# Patient Record
Sex: FEMALE | Race: BLACK OR AFRICAN AMERICAN | NOT HISPANIC OR LATINO | Employment: OTHER | URBAN - METROPOLITAN AREA
[De-identification: names, ages, dates, MRNs, and addresses within clinical notes are randomized per-mention and may not be internally consistent; named-entity substitution may affect disease eponyms.]

---

## 2021-07-13 ENCOUNTER — TRANSFERRED RECORDS (OUTPATIENT)
Dept: MULTI SPECIALTY CLINIC | Facility: CLINIC | Age: 51
End: 2021-07-13

## 2021-07-13 LAB — PAP SMEAR - HIM PATIENT REPORTED: NEGATIVE

## 2023-09-14 ENCOUNTER — TRANSFERRED RECORDS (OUTPATIENT)
Dept: HEALTH INFORMATION MANAGEMENT | Facility: CLINIC | Age: 53
End: 2023-09-14

## 2023-10-04 ENCOUNTER — OFFICE VISIT (OUTPATIENT)
Dept: FAMILY MEDICINE | Facility: CLINIC | Age: 53
End: 2023-10-04
Payer: MEDICAID

## 2023-10-04 VITALS
DIASTOLIC BLOOD PRESSURE: 76 MMHG | BODY MASS INDEX: 30.08 KG/M2 | TEMPERATURE: 97.5 F | OXYGEN SATURATION: 99 % | HEIGHT: 64 IN | SYSTOLIC BLOOD PRESSURE: 118 MMHG | HEART RATE: 66 BPM | RESPIRATION RATE: 18 BRPM | WEIGHT: 176.19 LBS

## 2023-10-04 DIAGNOSIS — Z00.00 ROUTINE GENERAL MEDICAL EXAMINATION AT A HEALTH CARE FACILITY: Primary | ICD-10-CM

## 2023-10-04 DIAGNOSIS — Z86.32 HISTORY OF GESTATIONAL DIABETES: ICD-10-CM

## 2023-10-04 DIAGNOSIS — Z12.11 SCREEN FOR COLON CANCER: ICD-10-CM

## 2023-10-04 DIAGNOSIS — Z12.31 VISIT FOR SCREENING MAMMOGRAM: ICD-10-CM

## 2023-10-04 DIAGNOSIS — E11.65 TYPE 2 DIABETES MELLITUS WITH HYPERGLYCEMIA, WITH LONG-TERM CURRENT USE OF INSULIN (H): ICD-10-CM

## 2023-10-04 DIAGNOSIS — Z79.4 TYPE 2 DIABETES MELLITUS WITH HYPERGLYCEMIA, WITH LONG-TERM CURRENT USE OF INSULIN (H): ICD-10-CM

## 2023-10-04 PROCEDURE — 99386 PREV VISIT NEW AGE 40-64: CPT | Performed by: NURSE PRACTITIONER

## 2023-10-04 PROCEDURE — 99214 OFFICE O/P EST MOD 30 MIN: CPT | Mod: 25 | Performed by: NURSE PRACTITIONER

## 2023-10-04 PROCEDURE — 99207 PR FOOT EXAM NO CHARGE: CPT | Performed by: NURSE PRACTITIONER

## 2023-10-04 RX ORDER — LISINOPRIL 2.5 MG/1
2.5 TABLET ORAL DAILY
Qty: 90 TABLET | Refills: 1 | Status: SHIPPED | OUTPATIENT
Start: 2023-10-04 | End: 2024-02-09

## 2023-10-04 RX ORDER — CETIRIZINE HYDROCHLORIDE 10 MG/1
10 TABLET ORAL PRN
COMMUNITY
End: 2024-02-09

## 2023-10-04 RX ORDER — LISINOPRIL 2.5 MG/1
2.5 TABLET ORAL DAILY
COMMUNITY
End: 2023-10-04

## 2023-10-04 RX ORDER — FLUTICASONE PROPIONATE 50 MCG
1 SPRAY, SUSPENSION (ML) NASAL DAILY
COMMUNITY
End: 2024-09-24

## 2023-10-04 RX ORDER — ATORVASTATIN CALCIUM 40 MG/1
40 TABLET, FILM COATED ORAL DAILY
COMMUNITY
End: 2023-10-04

## 2023-10-04 RX ORDER — ATORVASTATIN CALCIUM 40 MG/1
40 TABLET, FILM COATED ORAL DAILY
Qty: 90 TABLET | Refills: 1 | Status: SHIPPED | OUTPATIENT
Start: 2023-10-04 | End: 2024-09-24

## 2023-10-04 ASSESSMENT — ENCOUNTER SYMPTOMS
CHILLS: 0
HEADACHES: 0
ABDOMINAL PAIN: 0
BREAST MASS: 0
DIZZINESS: 0
MYALGIAS: 0
FEVER: 0
SORE THROAT: 0
NAUSEA: 0
JOINT SWELLING: 0
ARTHRALGIAS: 0
EYE PAIN: 0
FREQUENCY: 1
DIARRHEA: 0
HEMATURIA: 0
DYSURIA: 0
SHORTNESS OF BREATH: 0
WEAKNESS: 0
NERVOUS/ANXIOUS: 0
COUGH: 0
HEMATOCHEZIA: 0
HEARTBURN: 0
PALPITATIONS: 0
CONSTIPATION: 0
PARESTHESIAS: 0

## 2023-10-04 NOTE — PROGRESS NOTES
SUBJECTIVE:   CC: Sarai is an 53 year old who presents for preventive health visit.     Sarai is a 57 year old female (birthday is [birth year]) with a PMH of type 2 diabetes. She was originally dx in 2000 with gestational diabetes, and in 2008 started on insulin. She reports being treated for her thyroid approx 20 years ago, no current treatment. She recently moved here from [place] to be closer to a few of her children. She has 6 children and 10 grandchildren between [place], [place], and [place]. She presents today with her daughter to establish care. She has been without her insulin and Mounjaro for 2 weeks due to insurance issues/moving to [place]. She needs a refill on both and more test strips. She endorses poor control with the lowest A1C being around 9. She has a burning sensation in her feet and reports a 2-3 day hx of increase urinary frequency. Denies any burning, urgency, or pain.     She would like to do the following health maintenance:   Colonoscopy  mammogram      10/4/2023     1:44 PM   Additional Questions   Roomed by Lyla RODRIGUEZ       Healthy Habits:     Getting at least 3 servings of Calcium per day:  Yes    Bi-annual eye exam:  NO    Dental care twice a year:  NO    Sleep apnea or symptoms of sleep apnea:  None    Diet:  Diabetic    Frequency of exercise:  None    Taking medications regularly:  Yes    Medication side effects:  None    Additional concerns today:  Yes    Recently moved from [place]    Last A1c from 9/14/23 was 11.8      Today's PHQ-2 Score:       10/4/2023    10:48 AM   PHQ-2 ( 1999 [employer])   Q1: Little interest or pleasure in doing things 0   Q2: Feeling down, depressed or hopeless 0   PHQ-2 Score 0   Q1: Little interest or pleasure in doing things Not at all   Q2: Feeling down, depressed or hopeless Not at all   PHQ-2 Score 0       Diabetes Follow-up    How often are you checking your blood sugar? Not at all  What concerns do you have today about your diabetes? None and Blood sugar is often  over 200   Do you have any of these symptoms? (Select all that apply)  Burning in feet    Hyperlipidemia Follow-Up- started 2-3 weeks ago    Are you regularly taking any medication or supplement to lower your cholesterol?   Yes- Atorvastatin  Are you having muscle aches or other side effects that you think could be caused by your cholesterol lowering medication?  No    Hypertension Follow-up    Do you check your blood pressure regularly outside of the clinic? Yes   Are you following a low salt diet? No  Are your blood pressures ever more than 140 on the top number (systolic) OR more   than 90 on the bottom number (diastolic), for example 140/90? No    BP Readings from Last 2 Encounters:   10/04/23 118/76     No results found for: A1C, LDL    Have you ever done Advance Care Planning? (For example, a Health Directive, POLST, or a discussion with a medical provider or your loved ones about your wishes): No, advance care planning information given to patient to review.  Patient plans to discuss their wishes with loved ones or provider.      Social History     Tobacco Use    Smoking status: Never    Smokeless tobacco: Never   Substance Use Topics    Alcohol use: Never             10/4/2023    10:48 AM   Alcohol Use   Prescreen: >3 drinks/day or >7 drinks/week? No       Reviewed orders with patient.  Reviewed health maintenance and updated orders accordingly - Yes  Labs from [place] reviewed, CHICHI received    Breast Cancer Screening:        10/4/2023    10:50 AM   Breast CA Risk Assessment (FHS-7)   Do you have a family history of breast, colon, or ovarian cancer? No / Unknown       Mammogram Screening: Recommended mammography every 1-2 years with patient discussion and risk factor consideration  Pertinent mammograms are reviewed under the imaging tab.    History of abnormal Pap smear: NO - age 30-65 PAP every 5 years with negative HPV co-testing recommended     Reviewed and updated as needed this visit by clinical staff    "Tobacco   Meds              Reviewed and updated as needed this visit by Provider                 Past Medical History:   Diagnosis Date    Diabetes (H)       No past surgical history on file.    Review of Systems   Constitutional:  Negative for chills and fever.   HENT:  Negative for congestion, ear pain, hearing loss and sore throat.    Eyes:  Negative for pain and visual disturbance.   Respiratory:  Negative for cough and shortness of breath.    Cardiovascular:  Negative for chest pain, palpitations and peripheral edema.   Gastrointestinal:  Negative for abdominal pain, constipation, diarrhea, heartburn, hematochezia and nausea.   Breasts:  Negative for tenderness, breast mass and discharge.   Genitourinary:  Positive for frequency. Negative for dysuria, genital sores, hematuria, pelvic pain, vaginal bleeding and vaginal discharge.   Musculoskeletal:  Negative for arthralgias, joint swelling and myalgias.   Skin:  Negative for rash.   Neurological:  Negative for dizziness, weakness, headaches and paresthesias.   Psychiatric/Behavioral:  Negative for mood changes. The patient is not nervous/anxious.         OBJECTIVE:     /76   Pulse 66   Temp 97.5  F (36.4  C)   Resp 18   Ht 1.613 m (5' 3.5\")   Wt 79.9 kg (176 lb 3 oz)   SpO2 99%   BMI 30.72 kg/m      Physical Exam    GENERAL APPEARANCE: healthy, alert and no distress  EYES: Eyes grossly normal to inspection  HENT: ear canals and TM's normal, nose and mouth without ulcers or lesions, oropharynx clear and oral mucous membranes moist  NECK: no adenopathy, no asymmetry, masses  RESP: lungs clear to auscultation - no rales, rhonchi or wheezes  CV: regular rate and rhythm, normal S1 S2, no S3 or S4, no murmur, click or rub, no peripheral edema  ABDOMEN: soft, nontender, no masses and bowel sounds normal  MS: no musculoskeletal defects are noted and gait is age appropriate without ataxia  SKIN: no suspicious lesions or rashes and plantar warts - foot " "bilateral  PSYCH: mentation appears normal and affect normal/bright    Diabetic Foot Screen:  Any complaints of increased pain or numbness ?  YES \"burning\"  Is there a foot ulcer now or a history of foot ulcer? No   Does the foot have an abnormal shape? No   Are the nails thick, too long or ingrown? No   Are there any redness or open areas? No            Sensation Testing done at all points on the diagram with monofilament     Right Foot: Sensation Normal at all points  Left Foot: Sensation Normal at all points     Risk Category: 0- No loss of protective sensation  Performed by Zaida Oh DNP student at the [facility]          ASSESSMENT/PLAN:   Sarai was seen today for physical.    Diagnoses and all orders for this visit:    Routine general medical examination at a health care facility    Visit for screening mammogram  Referral placed  -     MA SCREENING DIGITAL BILAT - Future  (s+30); Future    Screen for colon cancer  Referral placed  -     Colonoscopy Screening  Referral; Future    Type 2 diabetes mellitus with hyperglycemia, with long-term current use of insulin (H)  History of gestational diabetes  Currently uncontrolled Diabetes.    Refills sent for lantus, lisinopril, lipitor, and test strips. In light of insurance coverage concerns will switch to semaglutide as patient was having success with Mounjaro will start at 0.5 mg (Semaglutide).  Return in two months for fasting lab panel to include A1C, BMP, lipid panel, and thyroid levels. In the meantime referrals for medication management, diabetes educator, and  opthalmology have been placed.   Recommend focus on diet and lifestyle modifications as well, encouraged patient to check fasting and 2 hours post-prandial blood sugars.    -     insulin glargine (LANTUS PEN) 100 UNIT/ML pen; Inject 32 Units Subcutaneous At Bedtime  -     lisinopril (ZESTRIL) 2.5 MG tablet; Take 1 tablet (2.5 mg) by mouth daily  -     atorvastatin (LIPITOR) 40 MG tablet; Take 1 " tablet (40 mg) by mouth daily  -     blood glucose (NO BRAND SPECIFIED) test strip; Use to test blood sugar 3 times daily or as directed.  -     Adult Eye  Referral; Future  -     semaglutide (OZEMPIC) 2 MG/3ML pen; Inject 0.5 mg Subcutaneous every 7 days  -     Med Therapy Management Referral  -     AMB Adult Diabetes Educator Referral; Future      Other orders  -     REVIEW OF HEALTH MAINTENANCE PROTOCOL ORDERS      Return in about 2 months (around 12/4/2023) for Diabetes Follow-up, In Clinic.      COUNSELING:  Reviewed preventive health counseling, as reflected in patient instructions  Special attention given to:        Vision screening       Colorectal Cancer Screening       Advance Care Planning        She reports that she has never smoked. She has never used smokeless tobacco.      Zaida Oh DNP student [employer]      I was present with the student who participated in the service and in the documentation of the note, which I have reviewed and verified. The history, reviews of systems, objective data, and assessment/plan were completed by myself.      VIKI Rushing [facility]

## 2023-10-04 NOTE — Clinical Note
Please abstract the following data from this visit with this patient into the appropriate field in Epic:  Tests that can be patient reported without a hard copy:  Pap smear done on this date: 7/13/2021 (approximately), by this group: [facility], results were negative/normal.   Other Tests found in the patient's chart through Chart Review/Care Everywhere:  {Abstract Quality List (Optional):254409}  Note to Abstraction: If this section is blank, no results were found via Chart Review/Care Everywhere.

## 2023-10-05 DIAGNOSIS — E11.65 TYPE 2 DIABETES MELLITUS WITH HYPERGLYCEMIA, WITH LONG-TERM CURRENT USE OF INSULIN (H): ICD-10-CM

## 2023-10-05 DIAGNOSIS — Z79.4 TYPE 2 DIABETES MELLITUS WITH HYPERGLYCEMIA, WITH LONG-TERM CURRENT USE OF INSULIN (H): ICD-10-CM

## 2023-10-05 NOTE — LETTER
November 24, 2023      Sarai Lopezyudelka  [address] [address]        We received a refill request from your pharmacy for your Ozempic medication.  At this time the nurses were able to give you a min refill, but you are due to be seen for a diabetic follow-up before the next refill.  This appointment can be scheduled by calling [phone] or can be scheduled via [employer] as well.    Thank you for choosing [facility]            Sincerely,          Evelin Cota, APRN, CNP

## 2023-10-13 ENCOUNTER — NURSE TRIAGE (OUTPATIENT)
Dept: FAMILY MEDICINE | Facility: CLINIC | Age: 53
End: 2023-10-13

## 2023-10-13 NOTE — TELEPHONE ENCOUNTER
Symptoms    Describe your symptoms: Blood sugars are over 377    Any pain: No - but diareah for 2 days    How long have you been having symptoms: 2  days - Peeing a lot - 2 wks    Have you been seen for this:  No    Appointment offered?: No    Triage offered?: Yes:     Home remedies tried: No    Preferred Pharmacy:         Could we send this information to you in [facility] or would you prefer to receive a phone call?:   Patient would prefer a phone call   Okay to leave a detailed message?: Yes at Cell number on file:    Telephone Information:   Mobile [phone]    -   PLEASE CALL HER  Talked to her Glenny - her daughter  - her # [phone]

## 2023-10-13 NOTE — TELEPHONE ENCOUNTER
Huddled with pcp- can come in for in person appointment  at 130 today-     Scheduled - advised of location, arrival and apt time.     Future Appointments 10/13/2023 - 4/10/2024        Date Visit Type Length Department Provider     10/13/2023  1:30 PM OFFICE VISIT 30 min [facility] Evelin Cota APRN CNP    Location Instructions:     [facility] is located at [address], [address] Free parking is available; access the lot by turning north from [address] onto [address], then west onto [address].              10/23/2023  9:00 AM NEW - MTM 60 min RI MTM Daniela Pop, RPH              10/23/2023  2:00 PM NEW ADULT EYE 30 min BK OPTOMETRY Royal, Brian L, OD              11/6/2023  5:30 PM MYC NEW OFFICE VISIT 30 min [facility] Kathleen Ojeda MD    Location Instructions:     [facility] is located at [address] about one mile east of the [address] exit off of [address]. To access the parking lot from [address], turn south onto [address]. From [address], turn west onto [address], then north onto [address].              12/1/2023 11:30 AM OFFICE VISIT 30 min [facility] Evelin Cota APRN CNP    Location Instructions:     [facility] is located at [address], [address] Free parking is available; access the lot by turning north from [address] onto [address], then west onto [address].

## 2023-10-13 NOTE — TELEPHONE ENCOUNTER
Attempt # 1    Called # [phone]     Left a non detailed VM to call back at [phone] and ask for any available Triage Nurse.    Hanna Miller RN  [facility]

## 2023-10-13 NOTE — TELEPHONE ENCOUNTER
Situation     Patient called back with daughter   She states her blood sugars are been over 350 for 5 days.  Today's blood sugar @ 5am today was 377     Background     She does not have ketone andrey at home - going to get one today.    Patient has been taking 21 units of Lantus in and am and 21 at night.   Has taken 21 units of  lantus this morning  Waiting for Ozempic P, notes  states in process as of 10/12  . Used to take Mounjaro     Assessment   Denied fruity or acetone breath   She has a moderate headache - took 2 tylenol and it helped the headache a little but it  comes back.   She feels weak   the daughter states she is forgetful but no confusion   She feels tired   States breathing is fine, denied breathing rapid   Voiding every 2 hours for the last 2 weeks   Diarrhea started today - 6 small liquid stools since 6am today   Up walking around- no dizziness  She ate oatmeal today and is drinking well.   Denied any other symptoms     Recommendations   Advised will huddle with pcp and see if she advices in person clinic appointment, ADS or ER   Epic message sent   Can't completely fill out protocol due to unknown ketone levels.   Patient and daughter is aware of all signs and symptoms of high and low blood sugar  Reviewed red flag symptoms. - ED/911   Daughters cell is [phone]-  Reason for Disposition   Patient sounds very sick or weak to the triager   Blood glucose > 300 mg/dL (16.7 mmol/L) AND two or more times in a row    Additional Information   Negative: Unconscious or difficult to awaken   Negative: Acting confused (e.g., disoriented, slurred speech)   Negative: Very weak (can't stand)   Negative: Sounds like a life-threatening emergency to the triager   Negative: Vomiting and signs of dehydration (e.g., very dry mouth, lightheaded, dark urine)   Negative: Blood glucose > 240 mg/dL (13.3 mmol/L) and rapid breathing   Negative: Vomiting lasting > 4 hours   Negative: Fever > 100.4 F (38.0 C)    Negative: Caller has URGENT medication or insulin pump question and triager unable to answer question   Negative: Blood glucose > 500 mg/dL (27.8 mmol/L)   Commented on: Blood glucose > 240 mg/dL (13.3 mmol/L) AND urine ketones moderate-large (or more than 1+)     Ketone level unknown    Protocols used: Diabetes - High Blood Sugar-A-[place]

## 2023-10-16 ENCOUNTER — TELEPHONE (OUTPATIENT)
Dept: FAMILY MEDICINE | Facility: CLINIC | Age: 53
End: 2023-10-16
Payer: MEDICAID

## 2023-10-16 DIAGNOSIS — E11.65 TYPE 2 DIABETES MELLITUS WITH HYPERGLYCEMIA, WITH LONG-TERM CURRENT USE OF INSULIN (H): Primary | ICD-10-CM

## 2023-10-16 DIAGNOSIS — Z79.4 TYPE 2 DIABETES MELLITUS WITH HYPERGLYCEMIA, WITH LONG-TERM CURRENT USE OF INSULIN (H): Primary | ICD-10-CM

## 2023-10-16 NOTE — TELEPHONE ENCOUNTER
Medication Question or Refill        What medication are you calling about (include dose and sig)?: Ozempic - Patient still wondering what is going on and why this was denied.  Is there another rx you were prescribing?  Daughter calling in    Preferred Pharmacy:       [id number] - [place], [place] - [address] AT [address]  [address]  [place] [address]  Phone: [phone] Fax: [phone]      Controlled Substance Agreement on file:   CSA -- Patient Level:    CSA: None found at the patient level.       Who prescribed the medication?: PCP    Do you need a refill? Yes    When did you use the medication last? Has not been on it for over a month.  Blood sugar going up and down    Patient offered an appointment? No    Do you have any questions or concerns?  Yes: above      Could we send this information to you in [employer] or would you prefer to receive a phone call?:   Patient would prefer a phone call   Okay to leave a detailed message?: Yes at Cell number on file:    Telephone Information:   Mobile [phone]     Daughters phone  Servando DOYLE

## 2023-10-17 RX ORDER — DAPAGLIFLOZIN 5 MG/1
5 TABLET, FILM COATED ORAL DAILY
Qty: 90 TABLET | Refills: 1 | Status: SHIPPED | OUTPATIENT
Start: 2023-10-17 | End: 2023-10-23

## 2023-10-17 NOTE — TELEPHONE ENCOUNTER
JUSTIN gross reviewed.   Required for trial of SGLT-Inhibitor like Farxiga.      Will send prescription to patient's pharmacy.     Additionally, recommend further consultation/management with PharmD. Referral placed.     Needs HgbA1C, plan lab only visit to complete.       - Maddison, CNP

## 2023-10-20 NOTE — PROGRESS NOTES
Medication Therapy Management (MTM) Encounter    ASSESSMENT:                            Medication Adherence/Access: See below for considerations    Diabetes:   Self monitoring of blood glucose is not at goal of fasting  mg/dL and post prandial < 180 mg/dL.  Encouraged patient to schedule lab visit to complete lab work, A1c.  Discussed with patient that her insurance in [place] does not cover Ozempic. Informed patient that her insurance does cover a medication like Ozempic called Victoza, which is a daily injection but is like Ozempic, but patient declined stating she needs Ozempic and this is what she was on in [place]. Discussed that she had different insurance in [place] and [place] Medicaid won't cover Ozempic unless she tries their preferred medications first. Offered for patient to try Farxiga or Jardiance, but patient said she tried a pill before to manage her diabetes and that caused severe diarrhea. Discussed that this was likely metformin, and these pills work differently and shouldn't cause those side effects. Patient declined and stated she will not take any pills to manage her diabetes. Reports she will only take Ozempic for diabetes. Tried to provide education that we are limited by what her insurance will cover, and Medicaid requires she try other options first. Offered to increase insulin further to help control sugars but patient declined. Patient reported that if I couldn't get her Ozempic she didn't want to talk to me any further and ended our visit.    Hypertension:   Stable.    Hyperlipidemia:   Encouraged patient to schedule lab visit to complete lab work, lipid panel.    Allergic rhinitis:   Stable.    Supplements:  Stable.      PLAN:                            Insurance doesn't cover Ozempic, and there isn't anything I can do to change that since you have [place] Medicaid. Medicaid does cover Victoza, a daily injection similar to Ozempic, and Farxiga or Jardiance. Reach out if you change your mind and would  "like to try either of these options. After trying these options your insurance may cover Ozempic.      Follow-up: Declined follow up. Patient will reach out if she wishes to talk further.    SUBJECTIVE/OBJECTIVE:                          Sarai Saunders is a 53 year old female called for an initial visit. She was referred to me from VIKI Barrett.      Reason for visit: med review, diabetes management.    Allergies/ADRs: Reviewed in chart  Past Medical History: Reviewed in chart  Tobacco: She reports that she has never smoked. She has never used smokeless tobacco.  Alcohol: not currently using    Medication Adherence/Access: Patient's insurance did not cover Ozempic. Medicaid does cover several SGLT-2's and Victoza instead.    Diabetes   Lantus 42 units daily    Patient reports that metformin caused a lot of diarrhea, and because of that she isn't interested in re-trying a pill for diabetes. Reports that even if Jardiance and Farxiga are different, she doesn't want to try these since Ozempic worked so well for her in the past.  Reports she recently moved here from [place]. When she was in [place] she was on once weekly Ozempic and reports this changed her life. Diabetes went from being uncontrolled to always within range. Reports it gave her more energy and felt like it gave her her life back. She wants to restart that here in [place], but hasn't been able to get it. Doesn't understand why her doctors won't give it to her.  Blood sugar monitoring: one time(s) daily; Ranges: (patient reported) Fasting- 400 mg/dL daily   Current diabetes symptoms: fatigue       Eye exam in the last 12 months? No  Foot exam: due  Urine Albumin: No results found for: \"UMALCR\"   No results found for: \"A1C\"    Hypertension   Lisinopril 2.5 mg daily  Patient reports no current medication side effects  Patient does not self-monitor blood pressure.       BP Readings from Last 3 Encounters:   10/04/23 118/76     Pulse Readings from Last 3 Encounters: "   10/04/23 66     Hyperlipidemia   Atorvastatin 40 mg daily  Patient reports no significant myalgias or other side effects.           Allergic Rhinitis:   cetirizine 10 mg as needed - hasn't needed lately  Flonase (fluticasone) nasal spray - 1 spray(s) each nostril once daily  No concerns with allergy symptoms at this time.    Supplements:  Calcium-vitamin D daily  No concerns with side effects.    Today's Vitals: There were no vitals taken for this visit.  ----------------      I spent 20 minutes with this patient today. All changes were made via collaborative practice agreement with VIKI Rushing CNP. A copy of the visit note was provided to the patient's provider(s).    A summary of these recommendations was sent via [employer].    Daniela Pop, PharmD  Medication Therapy Management Pharmacist  Voicemail: [phone]      Telemedicine Visit Details  Type of service:  Telephone visit  Start Time:  9:00 AM  End Time:  9:20 AM       Medication Therapy Recommendations  No medication therapy recommendations to display

## 2023-10-22 ENCOUNTER — HEALTH MAINTENANCE LETTER (OUTPATIENT)
Age: 53
End: 2023-10-22

## 2023-10-23 ENCOUNTER — HOSPITAL ENCOUNTER (EMERGENCY)
Facility: CLINIC | Age: 53
Discharge: HOME | End: 2023-10-23
Attending: EMERGENCY MEDICINE | Admitting: EMERGENCY MEDICINE
Payer: MEDICAID

## 2023-10-23 ENCOUNTER — VIRTUAL VISIT (OUTPATIENT)
Dept: PHARMACY | Facility: CLINIC | Age: 53
End: 2023-10-23
Attending: NURSE PRACTITIONER
Payer: MEDICAID

## 2023-10-23 ENCOUNTER — NURSE TRIAGE (OUTPATIENT)
Dept: FAMILY MEDICINE | Facility: CLINIC | Age: 53
End: 2023-10-23

## 2023-10-23 VITALS
RESPIRATION RATE: 18 BRPM | SYSTOLIC BLOOD PRESSURE: 158 MMHG | TEMPERATURE: 98.1 F | HEART RATE: 67 BPM | OXYGEN SATURATION: 98 % | DIASTOLIC BLOOD PRESSURE: 78 MMHG

## 2023-10-23 DIAGNOSIS — E11.65 TYPE 2 DIABETES MELLITUS WITH HYPERGLYCEMIA, WITH LONG-TERM CURRENT USE OF INSULIN (H): Primary | ICD-10-CM

## 2023-10-23 DIAGNOSIS — J30.1 ALLERGIC RHINITIS DUE TO POLLEN, UNSPECIFIED SEASONALITY: ICD-10-CM

## 2023-10-23 DIAGNOSIS — E11.65 HYPERGLYCEMIA DUE TO DIABETES MELLITUS (H): ICD-10-CM

## 2023-10-23 DIAGNOSIS — Z78.9 TAKES DIETARY SUPPLEMENTS: ICD-10-CM

## 2023-10-23 DIAGNOSIS — Z79.4 TYPE 2 DIABETES MELLITUS WITH HYPERGLYCEMIA, WITH LONG-TERM CURRENT USE OF INSULIN (H): ICD-10-CM

## 2023-10-23 DIAGNOSIS — E78.5 HYPERLIPIDEMIA LDL GOAL <70: ICD-10-CM

## 2023-10-23 DIAGNOSIS — I10 BENIGN ESSENTIAL HYPERTENSION: ICD-10-CM

## 2023-10-23 DIAGNOSIS — Z79.4 TYPE 2 DIABETES MELLITUS WITH HYPERGLYCEMIA, WITH LONG-TERM CURRENT USE OF INSULIN (H): Primary | ICD-10-CM

## 2023-10-23 DIAGNOSIS — E11.65 TYPE 2 DIABETES MELLITUS WITH HYPERGLYCEMIA, WITH LONG-TERM CURRENT USE OF INSULIN (H): ICD-10-CM

## 2023-10-23 LAB
ANION GAP SERPL CALCULATED.3IONS-SCNC: 7 MMOL/L (ref 7–15)
B-OH-BUTYR SERPL-SCNC: 0.3 MMOL/L
BASE EXCESS BLDV CALC-SCNC: 1.6 MMOL/L (ref -7.7–1.9)
BASOPHILS # BLD AUTO: 0 10E3/UL (ref 0–0.2)
BASOPHILS NFR BLD AUTO: 1 %
BUN SERPL-MCNC: 14.3 MG/DL (ref 6–20)
CALCIUM SERPL-MCNC: 8.8 MG/DL (ref 8.6–10)
CHLORIDE SERPL-SCNC: 100 MMOL/L (ref 98–107)
CREAT SERPL-MCNC: 0.51 MG/DL (ref 0.51–0.95)
DEPRECATED HCO3 PLAS-SCNC: 27 MMOL/L (ref 22–29)
EGFRCR SERPLBLD CKD-EPI 2021: >90 ML/MIN/1.73M2
EOSINOPHIL # BLD AUTO: 0.2 10E3/UL (ref 0–0.7)
EOSINOPHIL NFR BLD AUTO: 3 %
ERYTHROCYTE [DISTWIDTH] IN BLOOD BY AUTOMATED COUNT: 12.4 % (ref 10–15)
GLUCOSE BLDC GLUCOMTR-MCNC: 445 MG/DL (ref 70–99)
GLUCOSE SERPL-MCNC: 433 MG/DL (ref 70–99)
HBA1C MFR BLD: 11.9 %
HCO3 BLDV-SCNC: 28 MMOL/L (ref 21–28)
HCT VFR BLD AUTO: 38.5 % (ref 35–47)
HGB BLD-MCNC: 12.7 G/DL (ref 11.7–15.7)
IMM GRANULOCYTES # BLD: 0 10E3/UL
IMM GRANULOCYTES NFR BLD: 0 %
LYMPHOCYTES # BLD AUTO: 1.6 10E3/UL (ref 0.8–5.3)
LYMPHOCYTES NFR BLD AUTO: 33 %
MCH RBC QN AUTO: 29.3 PG (ref 26.5–33)
MCHC RBC AUTO-ENTMCNC: 33 G/DL (ref 31.5–36.5)
MCV RBC AUTO: 89 FL (ref 78–100)
MONOCYTES # BLD AUTO: 0.6 10E3/UL (ref 0–1.3)
MONOCYTES NFR BLD AUTO: 11 %
NEUTROPHILS # BLD AUTO: 2.5 10E3/UL (ref 1.6–8.3)
NEUTROPHILS NFR BLD AUTO: 52 %
NRBC # BLD AUTO: 0 10E3/UL
NRBC BLD AUTO-RTO: 0 /100
O2/TOTAL GAS SETTING VFR VENT: 0 %
PCO2 BLDV: 52 MM HG (ref 40–50)
PH BLDV: 7.35 [PH] (ref 7.32–7.43)
PLATELET # BLD AUTO: 256 10E3/UL (ref 150–450)
PO2 BLDV: 18 MM HG (ref 25–47)
POTASSIUM SERPL-SCNC: 4.9 MMOL/L (ref 3.4–5.3)
RBC # BLD AUTO: 4.34 10E6/UL (ref 3.8–5.2)
SODIUM SERPL-SCNC: 134 MMOL/L (ref 135–145)
WBC # BLD AUTO: 4.9 10E3/UL (ref 4–11)

## 2023-10-23 PROCEDURE — 83036 HEMOGLOBIN GLYCOSYLATED A1C: CPT | Performed by: EMERGENCY MEDICINE

## 2023-10-23 PROCEDURE — 99605 MTMS BY PHARM NP 15 MIN: CPT | Performed by: PHARMACIST

## 2023-10-23 PROCEDURE — 82010 KETONE BODYS QUAN: CPT | Performed by: EMERGENCY MEDICINE

## 2023-10-23 PROCEDURE — 82962 GLUCOSE BLOOD TEST: CPT

## 2023-10-23 PROCEDURE — 36415 COLL VENOUS BLD VENIPUNCTURE: CPT | Performed by: EMERGENCY MEDICINE

## 2023-10-23 PROCEDURE — 99283 EMERGENCY DEPT VISIT LOW MDM: CPT | Mod: 25

## 2023-10-23 PROCEDURE — 82310 ASSAY OF CALCIUM: CPT | Performed by: EMERGENCY MEDICINE

## 2023-10-23 PROCEDURE — 258N000003 HC RX IP 258 OP 636: Performed by: EMERGENCY MEDICINE

## 2023-10-23 PROCEDURE — 82803 BLOOD GASES ANY COMBINATION: CPT | Performed by: EMERGENCY MEDICINE

## 2023-10-23 PROCEDURE — 99607 MTMS BY PHARM ADDL 15 MIN: CPT | Performed by: PHARMACIST

## 2023-10-23 PROCEDURE — 85025 COMPLETE CBC W/AUTO DIFF WBC: CPT | Performed by: EMERGENCY MEDICINE

## 2023-10-23 PROCEDURE — 96360 HYDRATION IV INFUSION INIT: CPT

## 2023-10-23 RX ORDER — LIRAGLUTIDE 6 MG/ML
0.6 INJECTION SUBCUTANEOUS DAILY
Qty: 3 ML | Refills: 0 | Status: SHIPPED | OUTPATIENT
Start: 2023-10-23 | End: 2024-02-15

## 2023-10-23 RX ADMIN — SODIUM CHLORIDE 1000 ML: 9 INJECTION, SOLUTION INTRAVENOUS at 15:55

## 2023-10-23 ASSESSMENT — ACTIVITIES OF DAILY LIVING (ADL): ADLS_ACUITY_SCORE: 35

## 2023-10-23 NOTE — PATIENT INSTRUCTIONS
"Recommendations from today's MTM visit:                                                    MTM (medication therapy management) is a service provided by a clinical pharmacist designed to help you get the most of out of your medicines.   Today we reviewed what your medicines are for, how to know if they are working, that your medicines are safe and how to make your medicine regimen as easy as possible.      Insurance doesn't cover Ozempic, and there isn't anything I can do to change that since you have [place] Medicaid. Medicaid does cover Victoza, a daily injection similar to Ozempic, and Farxiga or Jardiance. Reach out if you change your mind and would like to try either of these options. After trying these options your insurance may cover Ozempic.    Follow-up: Reach out if you wish to discuss this further.    It was great speaking with you today.  I value your experience and would be very thankful for your time in providing feedback in our clinic survey. In the next few days, you may receive an email or text message from [employer] with a link to a survey related to your  clinical pharmacist.\"     To schedule another MTM appointment, please call the clinic directly or you may call the MTM scheduling line at [phone] or toll-free at [phone].     My Clinical Pharmacist's contact information:                                                      Please feel free to contact me with any questions or concerns you have.      Daniela Pop, Rm  Medication Therapy Management Pharmacist  Voicemail: [phone]    "

## 2023-10-23 NOTE — ED TRIAGE NOTES
Patient was sent from her provider as her blood sugar was over 500.  Patient is a insulin dependent diabetic and her insurance has not been willing to cover her insulin so she has not been taking it for the last month.      Triage Assessment (Adult)       Row Name 10/23/23 0634          Triage Assessment    Airway WDL WDL        Respiratory WDL    Respiratory WDL WDL        Skin Circulation/Temperature WDL    Skin Circulation/Temperature WDL WDL        Cardiac WDL    Cardiac WDL WDL        Peripheral/Neurovascular WDL    Peripheral Neurovascular WDL WDL        Cognitive/Neuro/Behavioral WDL    Cognitive/Neuro/Behavioral WDL WDL

## 2023-10-23 NOTE — Clinical Note
Hello,  I just wanted to let you know that I saw this patient. Please read my assessment and plan for full details. Ultimately I educated her that [place] [employer] insurance doesn't cover Ozempic, but they do cover Victoza or Farxiga/Jardiance. She declined anything that wasn't Ozempic. She was quite upset that we won't get her Ozempic. I don't know if you have any ideas where to go from here. Since she has [place] Medicaid she is required to try their preferred options first, and there isn't anything I can do to get Ozempic covered, unless we have documentation from her previous provider that she tried and failed all of the preferred options.  Thanks!  Daniela Pop, PharmD Medication Therapy Management Pharmacist Voicemail: [phone]

## 2023-10-23 NOTE — DISCHARGE INSTRUCTIONS
We are writing prescriptions for diabetic medication to increase her care at home.  If you are interested in Ozempic treatment please follow-up with regular doctor once her insurance changes on November 1 to discuss further management your high sugar if you develop fever or sugars that do not respond to medication emergency room is always here to reassess you.  I today's lab tests are all normal except your sugar of 445.  Thanks your patient today.    Sliding scale coverage: 4 meals breakfast lunch and dinner.    Do not give insulin if Premeal blood sugar less than 140   blood sugar 140-164 give 1 unit  165-189 give 2 units  190-214 give 3 units  215-239 give 4 units  240-264 give 5 units  265-289 give 6 units  290-314 give 7 units  315-339 give 8 units  340-364 give 9 units  Greater than 365 give 10 units    ` For bedtime blood sugar:  Do not give bedtime correction if blood sugar less than 200.  200-224 give 1 unit  225-249 give 2 unit  250-274 give 3 unit  275-299 give 4 unit  300-324 give 5 unit  325-349 give 6 unit  Greater than 349 give 7 units

## 2023-10-23 NOTE — TELEPHONE ENCOUNTER
Daughter calls about medications and high blood sugars.     [phone]- daughter that patient is with.   Patient is not with the daughter  that is calling.   No ctc on file.     Daughter states her mom/patient  does not do well with oral medications. - severe diarrhea and she gets dehydrated    Daughter states the patient took  Farxiga years ago and it did not help.     MOnjaro and lantus/ insulin has been the only medication that has helped her blood sugar control.     Daughter states her blood sugar was 400 this morning before eating and 470 about 1130   She states her mom is feeling very tired- advised will send to pcp for review.       Called patient to triage.   Explained to patient about alterative to Ozempic- Farxiga and will send provider information and will get back.    Patient is Alert but sounds tired. Speech is clear and logical.   Agrees she feels tired and lightheaded   Patient has not thrown up today.   Patient states her blood sugar was 479 within the last hour.   Patient took her lantus as directed as of last night.     Advised rationale for ER - patient states she is agreeable. Daughter is coming to take her.     Advised to call 911 if symptoms worsen.     Farxiga is not in epic.-     10/9/23 telephone encounter. = PA for Ozempic denied     10/17/23  9:12 AM  Note  Alternative medication - Farxiga sent to pharmacy.  - ARACELIS Washburn          Reason for Disposition   Vomiting and signs of dehydration (e.g., very dry mouth, lightheaded, dark urine)     Feels lightheaded   Blood glucose > 240 mg/dL (13.3 mmol/L) and rapid breathing     Denied rapid breathing.    Additional Information   Negative: Unconscious or difficult to awaken   Negative: Acting confused (e.g., disoriented, slurred speech)   Negative: Very weak (can't stand)   Negative: Sounds like a life-threatening emergency to the triager   Negative: Blood glucose > 500 mg/dL (27.8 mmol/L)   Negative: Blood glucose > 240 mg/dL (13.3 mmol/L)  AND urine ketones moderate-large (or more than 1+)     unknown   Negative: Blood glucose > 240 mg/dL (13.3 mmol/L) and blood ketones > 1.4 mmol/L     Unknown    Protocols used: Diabetes - High Blood Sugar-A-OH

## 2023-10-23 NOTE — TELEPHONE ENCOUNTER
Patient's daughter is calling along with patient and was advised prior authorization denial of ozempic and that farxiga was sent as an alternative (see 10/9/23 enc). Patient's daughter reports that patient has side effects of any oral diabetes medication, and was on farxiga 15 years ago and had a reaction. Patient did not  farxiga that was sent to pharmacy 10/17/23. Please advise. Patient advise of Evelin Cota's below but patient had another call from [facility] an picked it up, lab appointment not scheduled due to other call.

## 2023-10-23 NOTE — CONSULTS
Patient has insurance through [employer].    The following are covered for $0/mo:    GLP1  Bydureon Bcise  Victoza  Byetta     SGLT2i  Farxiga  Jardiance  Invokana    Rapid-acting insulin  Humalog pens & Humalog Mix pens  Novolog pens & Novolog Mix pens     Intermediate-acting insulin  Humulin N and mixes, vial only  Novolin N and mixes vial only     Basal insulin  Lantus pens  Levemir pens     Glucometers/CGM  Accu-Chek Hina Plus, Guide, and SmartView glucometers  Contour and Contour Next glucometers  Freestyle Nestor 3 sensors and reader    Michelle Bailey  Pharmacy Technician/Liaison, Discharge Pharmacy   [phone] (voice or text)  [email or web address]

## 2023-10-23 NOTE — ED PROVIDER NOTES
History     Chief Complaint:  Hyperglycemia    HPI   Sarai Saunders is a 53 year old female with history of diabetes presenting with concern for hyperglycemia. Patient states that she moved here recently from [place] and had her medication switched from Mounjaro to Ozempic. However she has been unable to receive Ozempic and states her insurance is unable to cover this until November, which is when the patient will switch to [employer]. States that she takes 32 units of Lantus before bed but has not been taking insulin with meals. Endorses polydipsia and polyuria. Reports her last A1C was 11%. She has tried other diabetes medications but reports that she often has reactions to these medications and needs to be switched.     Independent Historian:   None - Patient Only    Review of External Notes:       Medications:    Insulin aspart  Liraglutide  Atorvastatin  Calcium carbonate  Cetirizine  Insulin glargine  Lisinopril     Past Medical History:    Diabetes     Physical Exam   Patient Vitals for the past 24 hrs:   BP Temp Temp src Pulse Resp SpO2   10/23/23 1453 (!) 153/83 98.1  F (36.7  C) Tympanic 68 18 99 %        Physical Exam  HENT:      Head: Normocephalic.      Mouth/Throat:      Mouth: Mucous membranes are moist.   Eyes:      Pupils: Pupils are equal, round, and reactive to light.   Cardiovascular:      Rate and Rhythm: Normal rate and regular rhythm.   Pulmonary:      Effort: Pulmonary effort is normal.      Breath sounds: Normal breath sounds.   Musculoskeletal:         General: Normal range of motion.   Skin:     General: Skin is warm.      Capillary Refill: Capillary refill takes less than 2 seconds.      Coloration: Skin is not jaundiced.   Neurological:      General: No focal deficit present.      Mental Status: She is alert.   Psychiatric:         Mood and Affect: Mood normal.           Emergency Department Course     Laboratory:  Labs Ordered and Resulted from Time of ED Arrival to Time of ED Departure    GLUCOSE BY METER - Abnormal       Result Value    GLUCOSE BY METER POCT 445 (*)    BASIC METABOLIC PANEL - Abnormal    Sodium 134 (*)     Potassium 4.9      Chloride 100      Carbon Dioxide (CO2) 27      Anion Gap 7      Urea Nitrogen 14.3      Creatinine 0.51      GFR Estimate >90      Calcium 8.8      Glucose 433 (*)    KETONE BETA-HYDROXYBUTYRATE QUANTITATIVE, RAPID - Normal    Ketone (Beta-Hydroxybutyrate) Quantitative 0.30     CBC WITH PLATELETS AND DIFFERENTIAL    WBC Count 4.9      RBC Count 4.34      Hemoglobin 12.7      Hematocrit 38.5      MCV 89      MCH 29.3      MCHC 33.0      RDW 12.4      Platelet Count 256      % Neutrophils 52      % Lymphocytes 33      % Monocytes 11      % Eosinophils 3      % Basophils 1      % Immature Granulocytes 0      NRBCs per 100 WBC 0      Absolute Neutrophils 2.5      Absolute Lymphocytes 1.6      Absolute Monocytes 0.6      Absolute Eosinophils 0.2      Absolute Basophils 0.0      Absolute Immature Granulocytes 0.0      Absolute NRBCs 0.0     BLOOD GAS VENOUS   HEMOGLOBIN A1C   ROUTINE UA WITH MICROSCOPIC REFLEX TO CULTURE     Emergency Department Course & Assessments:       Interventions:  Medications   sodium chloride 0.9% BOLUS 1,000 mL (1,000 mLs Intravenous $New Bag 10/23/23 1556)      Assessments:  1525 I entered the patient's room and obtained history.  1654 I rechecked the patient and explained findings. I believe that they are safe for discharge at this time.    Independent Interpretation (X-rays, CTs, rhythm strip):  None    Consultations/Discussion of Management or Tests:  Consulted with pharmacy.        Social Determinants of Health affecting care:   None and Healthcare Access/Compliance    Disposition:  The patient was discharged to home.     Impression & Plan      Medical Decision Making:  Patient arrives from out of town.  Recently transition to [employer] assistance in [facility].  Patient having trouble managing her sugar at 1 point recommended Ozempic but  not covered under insurance.  Referred from primary care due to elevated blood sugar.  No signs of DKA or acidosis no other major electrolyte imbalance.  I did consult the pharmacist in the emergency room and recommendations were to use Victoza as a short bridge until follow-up.  Patient is new plan starts on 1 November.  May qualify for Ozempic at that time.  For now patient will be treated with Victoza insulin sliding scale and follow-up with primary care patient was discharged home in stable condition.      Diagnosis:    ICD-10-CM    1. Hyperglycemia due to diabetes mellitus (H)  E11.65         Discharge Medications:  New Prescriptions    INSULIN ASPART (NOVOLOG PEN) 100 UNIT/ML PEN    Inject 1-10 Units Subcutaneous 4 times daily (with meals and nightly)    INSULIN PEN NEEDLE (32G X 6 MM) 32G X 6 MM MISCELLANEOUS    Use 4 pen needles daily or as directed.    LIRAGLUTIDE (VICTOZA) 18 MG/3ML SOLUTION    Inject 0.6 mg Subcutaneous daily     Scribe Disclosure:  Jimmy HOLM Hired, am serving as a scribe at 4:41 PM on 10/23/2023 to document services personally performed by Warren Andrade MD based on my observations and the provider's statements to me.   10/23/2023   Warren Andrade MD Goodman, Brian Samuel, MD  10/28/23 0425

## 2023-10-23 NOTE — TELEPHONE ENCOUNTER
Medication Question or Refill        What medication are you calling about (include dose and sig)?:     insulin glargine (LANTUS PEN) 100 UNIT/ML pen     Patient also states that Ozempic was supposed to be ordered as well.    Preferred Pharmacy:       [id number] - [place], [place] - [address] [place] [address] [address]  [facility] [address]  Phone: [phone] Fax: [phone]      Controlled Substance Agreement on file:   CSA -- Patient Level:    CSA: None found at the patient level.       Who prescribed the medication?: PCP    Do you need a refill? Yes    When did you use the medication last? This will be the first time patient has used meds.  Ordered  - doctor wants her on Ozempic    Patient offered an appointment? No    Do you have any questions or concerns?  Yes: Why is it taking so long?      Could we send this information to you in [facility] or would you prefer to receive a phone call?:   Patient would prefer a phone call   Okay to leave a detailed message?: Yes at Cell number on file:    Telephone Information:   Mobile [phone]

## 2023-10-24 NOTE — TELEPHONE ENCOUNTER
Noted and reviewed.  Seen in ED on 10/23/23.  Recommend in clinic follow-up ED visit.     - Maddison, CNP

## 2023-10-25 NOTE — TELEPHONE ENCOUNTER
Reviewed.     Was seen by PharmD (virtually) and in ED on 10/23/23 for hyperglycemia.   Mealtime insulin and Victoza initiated.      Recommend close follow-up for DM recheck in clinic (scheduled with Dr. Ojeda on 11/6/23).     Please ensure patient is aware and will plan DM follow-up.      Encourage patient to continue to follow with PharmD.     - Maddison, CNP

## 2023-10-26 NOTE — TELEPHONE ENCOUNTER
Placed call to patient and phone # is for daughter Allan, no c2c on file. Patient's daughter brought patient, and verbal consent was given. Patient and patient's daughter was advised of Evelin Cota's message below, and presented understanding. Patient reports she will like to follow up with Dr. Ojeda, 12/1/23 appt with Evelin Cota cancelled per request.

## 2023-10-31 ENCOUNTER — TELEPHONE (OUTPATIENT)
Dept: FAMILY MEDICINE | Facility: CLINIC | Age: 53
End: 2023-10-31
Payer: MEDICAID

## 2023-10-31 DIAGNOSIS — E11.65 TYPE 2 DIABETES MELLITUS WITH HYPERGLYCEMIA, WITH LONG-TERM CURRENT USE OF INSULIN (H): ICD-10-CM

## 2023-10-31 DIAGNOSIS — Z79.4 TYPE 2 DIABETES MELLITUS WITH HYPERGLYCEMIA, WITH LONG-TERM CURRENT USE OF INSULIN (H): ICD-10-CM

## 2023-10-31 NOTE — TELEPHONE ENCOUNTER
Glucometer, lancets and test strips sent to pharmacy.     Please inform patient.     - Maddison, CNP

## 2023-10-31 NOTE — TELEPHONE ENCOUNTER
Medication Question or Refill    PU monitor in [place] last time.  Need a facility to pu a new one?      What medication are you calling about (include dose and sig)?: DME  - daughter is calling for a new MONITOR for mom.  Says her other one is NOT working and she was unable to test today.  How can she  do that. Her other one is over 2years old.    Preferred Pharmacy:       [facility] #[id number] - [place] [address] AT [facility] [address] [address]  Phone: [phone] Fax: [phone]      Controlled Substance Agreement on file:   CSA -- Patient Level:    CSA: None found at the patient level.       Who prescribed the medication?: Mi    Do you need a refill? No    When did you use the medication last? na    Patient offered an appointment? No    Do you have any questions or concerns?  No      Could we send this information to you in [facility] or would you prefer to receive a phone call?:   Patient would prefer a phone call   Okay to leave a detailed message?: Yes at Cell number on file:    Telephone Information:   Mobile [phone] and daughter Allan      Lisset DOYLE

## 2023-11-16 NOTE — TELEPHONE ENCOUNTER
Mami Newell Jen, MA1 month ago       Please see telephone encounter from 10/09/2023.    The Central PA Team cannot work out of Rx Auth.  In the future, please create a new telephone encounter for the medication that requires a PA and route to the Central PA Team.    Thanks,  Central PA Team

## 2023-11-17 RX ORDER — SEMAGLUTIDE 0.68 MG/ML
0.5 INJECTION, SOLUTION SUBCUTANEOUS
Refills: 5 | OUTPATIENT
Start: 2023-11-17

## 2023-11-17 NOTE — TELEPHONE ENCOUNTER
PA previously denied for Ozempic.     Please notify patient, encouraged Diabetes follow-up, needs follow-up.     - Maddison, CNP

## 2024-02-09 ENCOUNTER — MYC REFILL (OUTPATIENT)
Dept: FAMILY MEDICINE | Facility: CLINIC | Age: 54
End: 2024-02-09

## 2024-02-09 ENCOUNTER — E-VISIT (OUTPATIENT)
Dept: FAMILY MEDICINE | Facility: CLINIC | Age: 54
End: 2024-02-09
Payer: COMMERCIAL

## 2024-02-09 DIAGNOSIS — E11.65 TYPE 2 DIABETES MELLITUS WITH HYPERGLYCEMIA, WITH LONG-TERM CURRENT USE OF INSULIN (H): Primary | ICD-10-CM

## 2024-02-09 DIAGNOSIS — Z79.4 TYPE 2 DIABETES MELLITUS WITH HYPERGLYCEMIA, WITH LONG-TERM CURRENT USE OF INSULIN (H): Primary | ICD-10-CM

## 2024-02-09 DIAGNOSIS — Z79.4 TYPE 2 DIABETES MELLITUS WITH HYPERGLYCEMIA, WITH LONG-TERM CURRENT USE OF INSULIN (H): ICD-10-CM

## 2024-02-09 DIAGNOSIS — Z00.00 HEALTHCARE MAINTENANCE: Primary | ICD-10-CM

## 2024-02-09 DIAGNOSIS — E11.65 TYPE 2 DIABETES MELLITUS WITH HYPERGLYCEMIA, WITH LONG-TERM CURRENT USE OF INSULIN (H): ICD-10-CM

## 2024-02-09 PROCEDURE — 99207 PR NON-BILLABLE SERV PER CHARTING: CPT | Performed by: NURSE PRACTITIONER

## 2024-02-10 NOTE — TELEPHONE ENCOUNTER
Please schedule DM follow-up visit for patient in clinic on my schedule within the next one week.  Patient is due for recheck and isn't appropriate to have this addressed via an E-visit.     - Maddison, CNP

## 2024-02-10 NOTE — PATIENT INSTRUCTIONS
Thank you for choosing us for your care. I think an in-clinic visit would be best next steps based on your symptoms. Please schedule a clinic appointment; you won t be charged for this eVisit.      You can schedule an appointment right here in [facility], or call [phone]

## 2024-02-12 RX ORDER — CETIRIZINE HYDROCHLORIDE 10 MG/1
10 TABLET ORAL PRN
Qty: 90 TABLET | Refills: 3 | Status: SHIPPED | OUTPATIENT
Start: 2024-02-12

## 2024-02-12 RX ORDER — LISINOPRIL 2.5 MG/1
2.5 TABLET ORAL DAILY
Qty: 90 TABLET | Refills: 1 | Status: SHIPPED | OUTPATIENT
Start: 2024-02-12 | End: 2024-05-02

## 2024-02-12 NOTE — TELEPHONE ENCOUNTER
Pending Prescriptions:                       Disp   Refills    Calcium Carbonate-Vitamin D 300-2.5 MG-MC*                  cetirizine (ZYRTEC) 10 MG tablet                              Sig: Take 1 tablet (10 mg) by mouth as needed for           allergies    insulin glargine (LANTUS PEN) 100 UNIT/ML*45 mL  1            Sig: Inject 32 Units Subcutaneous at bedtime    lisinopril (ZESTRIL) 2.5 MG tablet        90 tab*1            Sig: Take 1 tablet (2.5 mg) by mouth daily    blood glucose (NO BRAND SPECIFIED) test s*100 st*5            Sig: Use to test blood sugar 3 times daily or as           directed.    blood glucose (NO BRAND SPECIFIED) lancet*100 La*3            Sig: Use to test blood sugar 3 times daily or as           directed.    Cetirizine listed historical. The rest failed protocol.    Future Appointments 2/12/2024 - 8/10/2024        Date Visit Type Length Department Provider     2/15/2024  3:30 PM OFFICE VISIT 30 min  FAMILY PRACTICE Evelin Cota APRN CNP    Location Instructions:     [facility] is located at [address], [address] Free parking is available; access the lot by turning north from [place] [address] onto [address], then west onto [address].              2/22/2024  3:00 PM OFFICE VISIT 30 min  FAMILY PRACTICE Evelin Cota APRN CNP    Location Instructions:     [facility] is located at [address], [address] Free parking is available; access the lot by turning north from [place] [address] onto [address], then west onto [address].

## 2024-02-12 NOTE — TELEPHONE ENCOUNTER
Cld LM w/pt to call us back to schedule diabetes check up appt.  See encounter and pls assist pt when they call.    Lisset DOYLE

## 2024-02-12 NOTE — TELEPHONE ENCOUNTER
Due for Diabetes follow-up visit.  Please reach out to assist with scheduling.     - Maddison, CNP

## 2024-02-15 ENCOUNTER — TELEPHONE (OUTPATIENT)
Dept: FAMILY MEDICINE | Facility: CLINIC | Age: 54
End: 2024-02-15

## 2024-02-15 ENCOUNTER — VIRTUAL VISIT (OUTPATIENT)
Dept: FAMILY MEDICINE | Facility: CLINIC | Age: 54
End: 2024-02-15
Payer: COMMERCIAL

## 2024-02-15 DIAGNOSIS — E11.65 TYPE 2 DIABETES MELLITUS WITH HYPERGLYCEMIA, WITH LONG-TERM CURRENT USE OF INSULIN (H): Primary | ICD-10-CM

## 2024-02-15 DIAGNOSIS — R03.0 ELEVATED BLOOD PRESSURE READING WITHOUT DIAGNOSIS OF HYPERTENSION: ICD-10-CM

## 2024-02-15 DIAGNOSIS — Z79.4 TYPE 2 DIABETES MELLITUS WITH HYPERGLYCEMIA, WITH LONG-TERM CURRENT USE OF INSULIN (H): Primary | ICD-10-CM

## 2024-02-15 DIAGNOSIS — Z13.220 SCREENING FOR LIPID DISORDERS: ICD-10-CM

## 2024-02-15 DIAGNOSIS — Z13.21 ENCOUNTER FOR VITAMIN DEFICIENCY SCREENING: ICD-10-CM

## 2024-02-15 PROCEDURE — 99214 OFFICE O/P EST MOD 30 MIN: CPT | Mod: 95 | Performed by: NURSE PRACTITIONER

## 2024-02-15 NOTE — PROGRESS NOTES
"Sarai is a 54 year old who is being evaluated via a billable video visit.      How would you like to obtain your AVS? MyChart  If the video visit is dropped, the invitation should be resent by: Text to cell phone: [phone]  Will anyone else be joining your video visit? No        Assessment & Plan     Sarai was seen today for recheck medication and diabetes.    Diagnoses and all orders for this visit:    Type 2 diabetes mellitus with hyperglycemia, with long-term current use of insulin (H)  Not currently well controlled, due for labs as below.   Encouraged regular DM follow-up and monitoring for medication adjustment.   Would benefit from MTM or DM Education for consistent follow-up.    Restart Semaglutide, prescription sent to pharmacy and will plan to continue Lantus 32 units daily.   Close follow-up in clinic next week.    Continue with dietary and lifestyle modifications to support lowering of blood sugars.    -     semaglutide (OZEMPIC) 2 MG/3ML pen; Inject 0.5 mg Subcutaneous every 7 days  -     Comprehensive metabolic panel (BMP + Alb, Alk Phos, ALT, AST, Total. Bili, TP); Future  -     Hemoglobin A1c; Future  -     CBC with platelets; Future  -     Albumin Random Urine Quantitative with Creat Ratio; Future    Screening for lipid disorders  -     Lipid panel reflex to direct LDL Fasting; Future    Encounter for vitamin deficiency screening  -     Vitamin D Deficiency; Future    Elevated blood pressure reading without diagnosis of hypertension  Monitor blood pressure at home, bring record into clinic.    -     Blood Pressure Monitor KIT; 1 Device daily as needed (BP check)      Recommend fasting, lab only appointment prior to next week's appointment.    Scheduled for in clinic appt on 2/22/24, stressed importance of keeping appointment.          BMI  Estimated body mass index is 30.72 kg/m  as calculated from the following:    Height as of 10/4/23: 1.613 m (5' 3.5\").    Weight as of 10/4/23: 79.9 kg (176 lb 3 " oz).     Return in about 1 week (around 2/22/2024) for Med check, Diabetes Follow-up, In Clinic.      Burke Moon is a 54 year old, presenting for the following health issues:  Recheck Medication and Diabetes        2/15/2024     3:18 PM   Additional Questions   Roomed by No ORONA   History of Present Illness     Hemoglobin A1C   Date Value Ref Range Status   10/23/2023 11.9 (H) <5.7 % Final     Comment:     Normal <5.7%   Prediabetes 5.7-6.4%    Diabetes 6.5% or higher     Note: Adopted from ADA consensus guidelines.     Accompanied by her daughter during today's video visit.      Patient's daughter reports that her blood sugars have been elevated.    Fasting 190's - 210's.    Now has coverage for GLP-1 agonist - Ozempic and would like to restart.    Is currently taking 32 units of Lantus daily.    Has not been using any insulin with meals or sliding scale fee - does not have refills.    Was given Victoza at ED visit - this only slightly helped.  Didn't have refills for this.      Elevated blood pressure as well - 168/87 at [facility] yesterday.      Diabetes:   She presents for follow up of diabetes.  She is checking home blood glucose one time daily.   She checks blood glucose before meals.  Blood glucose is sometimes over 200 and never under 70. She is aware of hypoglycemia symptoms including shakiness and dizziness.   She is concerned about blood sugar frequently over 200.   She is having weight gain.  The patient has not had a diabetic eye exam in the last 12 months.          She eats 4 or more servings of fruits and vegetables daily.She consumes 0 sweetened beverage(s) daily.She exercises with enough effort to increase her heart rate 10 to 19 minutes per day.  She exercises with enough effort to increase her heart rate 4 days per week.   She is taking medications regularly.         Review of Systems  Constitutional, HEENT, cardiovascular, pulmonary, gi and gu systems are negative, except as otherwise  noted.      Objective           Vitals:  No vitals were obtained today due to virtual visit.    Physical Exam   GENERAL: alert and no distress  EYES: Eyes grossly normal to inspection.  No discharge or erythema, or obvious scleral/conjunctival abnormalities.  RESP: No audible wheeze, cough, or visible cyanosis.    SKIN: Visible skin clear. No significant rash, abnormal pigmentation or lesions.  NEURO: Cranial nerves grossly intact.  Mentation and speech appropriate for age.  PSYCH: Appropriate affect, tone, and pace of words    Video-Visit Details    Type of service:  Video Visit       Originating Location (pt. Location): Home    Distant Location (provider location):  On-site  Platform used for Video Visit: William  Signed Electronically by: VIKI Rushing CNP

## 2024-02-15 NOTE — TELEPHONE ENCOUNTER
Prior Authorization Retail Medication Request    Medication/Dose: Ozempic 0.25 or 0.5 MG/dose 2MG/3ML Pen Injectors  Diagnosis and ICD code (if different than what is on RX):  NA  New/renewal/insurance change PA/secondary ins. PA:  Previously Tried and Failed:  NA  Rationale:  NA    Insurance   Primary: [facility]  Insurance ID:  [id number]         Pharmacy Information (if different than what is on RX)  Name:  [facility]  Phone:  [phone]  Fax:[phone]

## 2024-02-21 NOTE — TELEPHONE ENCOUNTER
PA Initiation    Medication: OZEMPIC (0.25 OR 0.5 MG/DOSE) 2 MG/3ML SC SOPN  Insurance Company: [employer] - Phone [phone] Fax [phone]  [facility] the [id number] [place] [id number] [facility] [address] & [address]  Filling Pharmacy Phone: [phone]  Filling Pharmacy Fax:    Start Date: 2/21/2024

## 2024-02-21 NOTE — TELEPHONE ENCOUNTER
Patient calling regarding PA status. See telephone encounter 2/21/23 for details. Ingrid Ludwig R.N.

## 2024-02-22 ENCOUNTER — OFFICE VISIT (OUTPATIENT)
Dept: FAMILY MEDICINE | Facility: CLINIC | Age: 54
End: 2024-02-22
Payer: COMMERCIAL

## 2024-02-22 VITALS
BODY MASS INDEX: 30.77 KG/M2 | WEIGHT: 180.2 LBS | HEIGHT: 64 IN | SYSTOLIC BLOOD PRESSURE: 134 MMHG | TEMPERATURE: 97.9 F | DIASTOLIC BLOOD PRESSURE: 70 MMHG | HEART RATE: 75 BPM | RESPIRATION RATE: 18 BRPM | OXYGEN SATURATION: 100 %

## 2024-02-22 DIAGNOSIS — Z12.11 SCREEN FOR COLON CANCER: ICD-10-CM

## 2024-02-22 DIAGNOSIS — Z79.4 TYPE 2 DIABETES MELLITUS WITH HYPERGLYCEMIA, WITH LONG-TERM CURRENT USE OF INSULIN (H): Primary | ICD-10-CM

## 2024-02-22 DIAGNOSIS — Z13.21 ENCOUNTER FOR VITAMIN DEFICIENCY SCREENING: ICD-10-CM

## 2024-02-22 DIAGNOSIS — E11.65 TYPE 2 DIABETES MELLITUS WITH HYPERGLYCEMIA, WITH LONG-TERM CURRENT USE OF INSULIN (H): Primary | ICD-10-CM

## 2024-02-22 DIAGNOSIS — Z13.220 SCREENING FOR LIPID DISORDERS: ICD-10-CM

## 2024-02-22 LAB
ALBUMIN SERPL BCG-MCNC: 4.3 G/DL (ref 3.5–5.2)
ALP SERPL-CCNC: 89 U/L (ref 40–150)
ALT SERPL W P-5'-P-CCNC: 20 U/L (ref 0–50)
ANION GAP SERPL CALCULATED.3IONS-SCNC: 11 MMOL/L (ref 7–15)
AST SERPL W P-5'-P-CCNC: 24 U/L (ref 0–45)
BILIRUB SERPL-MCNC: 0.4 MG/DL
BUN SERPL-MCNC: 16 MG/DL (ref 6–20)
CALCIUM SERPL-MCNC: 9.7 MG/DL (ref 8.6–10)
CHLORIDE SERPL-SCNC: 97 MMOL/L (ref 98–107)
CHOLEST SERPL-MCNC: 209 MG/DL
CREAT SERPL-MCNC: 0.58 MG/DL (ref 0.51–0.95)
CREAT UR-MCNC: 161 MG/DL
DEPRECATED HCO3 PLAS-SCNC: 26 MMOL/L (ref 22–29)
EGFRCR SERPLBLD CKD-EPI 2021: >90 ML/MIN/1.73M2
ERYTHROCYTE [DISTWIDTH] IN BLOOD BY AUTOMATED COUNT: 12 % (ref 10–15)
FASTING STATUS PATIENT QL REPORTED: YES
GLUCOSE SERPL-MCNC: 244 MG/DL (ref 70–99)
HBA1C MFR BLD: 11.4 % (ref 0–5.6)
HCT VFR BLD AUTO: 39.1 % (ref 35–47)
HDLC SERPL-MCNC: 62 MG/DL
HGB BLD-MCNC: 13.5 G/DL (ref 11.7–15.7)
LDLC SERPL CALC-MCNC: 130 MG/DL
MCH RBC QN AUTO: 29 PG (ref 26.5–33)
MCHC RBC AUTO-ENTMCNC: 34.5 G/DL (ref 31.5–36.5)
MCV RBC AUTO: 84 FL (ref 78–100)
MICROALBUMIN UR-MCNC: 135 MG/L
MICROALBUMIN/CREAT UR: 83.85 MG/G CR (ref 0–25)
NONHDLC SERPL-MCNC: 147 MG/DL
PLATELET # BLD AUTO: 312 10E3/UL (ref 150–450)
POTASSIUM SERPL-SCNC: 4.7 MMOL/L (ref 3.4–5.3)
PROT SERPL-MCNC: 7.2 G/DL (ref 6.4–8.3)
RBC # BLD AUTO: 4.66 10E6/UL (ref 3.8–5.2)
SODIUM SERPL-SCNC: 134 MMOL/L (ref 135–145)
TRIGL SERPL-MCNC: 83 MG/DL
VIT D+METAB SERPL-MCNC: 49 NG/ML (ref 20–50)
WBC # BLD AUTO: 4.6 10E3/UL (ref 4–11)

## 2024-02-22 PROCEDURE — 82306 VITAMIN D 25 HYDROXY: CPT | Performed by: NURSE PRACTITIONER

## 2024-02-22 PROCEDURE — 80061 LIPID PANEL: CPT | Performed by: NURSE PRACTITIONER

## 2024-02-22 PROCEDURE — 83036 HEMOGLOBIN GLYCOSYLATED A1C: CPT | Performed by: NURSE PRACTITIONER

## 2024-02-22 PROCEDURE — 85027 COMPLETE CBC AUTOMATED: CPT | Performed by: NURSE PRACTITIONER

## 2024-02-22 PROCEDURE — 80053 COMPREHEN METABOLIC PANEL: CPT | Performed by: NURSE PRACTITIONER

## 2024-02-22 PROCEDURE — 82570 ASSAY OF URINE CREATININE: CPT | Performed by: NURSE PRACTITIONER

## 2024-02-22 PROCEDURE — 82043 UR ALBUMIN QUANTITATIVE: CPT | Performed by: NURSE PRACTITIONER

## 2024-02-22 PROCEDURE — 36415 COLL VENOUS BLD VENIPUNCTURE: CPT | Performed by: NURSE PRACTITIONER

## 2024-02-22 PROCEDURE — 99214 OFFICE O/P EST MOD 30 MIN: CPT | Performed by: NURSE PRACTITIONER

## 2024-02-22 NOTE — PROGRESS NOTES
"  Assessment & Plan   Sarai was seen today for recheck medication.    Diagnoses and all orders for this visit:    Type 2 diabetes mellitus with hyperglycemia, with long-term current use of insulin (H)  Hemoglobin A1C   Date Value Ref Range Status   02/22/2024 11.4 (H) 0.0 - 5.6 % Final   10/23/2023 11.9 (H) <5.7 % Final     Comment:     Normal <5.7%   Prediabetes 5.7-6.4%    Diabetes 6.5% or higher     Note: Adopted from ADA consensus guidelines.     Uncontrolled Type 2 Diabetes.  Pending GLP-1 agonist prior authorization.   Will plan increase of Lantus to 35 units daily and add meal-time insulin 5 units with each meal.   Encouraged working with PharmD on a consistent basis for improved Diabetes control - referral placed.  Recommended checking fasting, 2 hours post-prandial and prior to bedtime blood sugars.    Close follow-up in clinic in 3 months for recheck of HgbA1C.    -     Comprehensive metabolic panel (BMP + Alb, Alk Phos, ALT, AST, Total. Bili, TP)  -     Hemoglobin A1c  -     CBC with platelets  -     Albumin Random Urine Quantitative with Creat Ratio  -     Glucose, whole blood; Future  -     Med Therapy Management Referral  -     insulin aspart (NOVOLOG PEN) 100 UNIT/ML pen; Inject 5 Units Subcutaneous 3 times daily (with meals)  -     insulin glargine (LANTUS PEN) 100 UNIT/ML pen; Inject 35 Units Subcutaneous at bedtime    Screening for lipid disorders  -     Lipid panel reflex to direct LDL Fasting    Encounter for vitamin deficiency screening  -     Vitamin D Deficiency    Screen for colon cancer  -     Colonoscopy Screening  Referral; Future        BMI  Estimated body mass index is 31.42 kg/m  as calculated from the following:    Height as of this encounter: 1.613 m (5' 3.5\").    Weight as of this encounter: 81.7 kg (180 lb 3.2 oz).     Return in about 3 months (around 5/22/2024) for Diabetes Follow-up, Med check, In Clinic.        Burke Moon is a 54 year old, presenting for the " "following health issues:  Recheck Medication        2/22/2024     2:43 PM   Additional Questions   Roomed by Bette Ahuja MA     History of Present Illness       Diabetes:   She presents for follow up of diabetes.  She is checking home blood glucose one time daily.   She checks blood glucose before meals.  Blood glucose is sometimes over 200 and never under 70. She is aware of hypoglycemia symptoms including shakiness and dizziness.   She is concerned about blood sugar frequently over 200.   She is having weight gain.  The patient has not had a diabetic eye exam in the last 12 months.          She eats 4 or more servings of fruits and vegetables daily.She consumes 0 sweetened beverage(s) daily.She exercises with enough effort to increase her heart rate 10 to 19 minutes per day.  She exercises with enough effort to increase her heart rate 4 days per week.   She is taking medications regularly.     Fasting blood sugars ranging from 180's/200's to 375.      Patient is scheduling eye exam with [facility] today.           Review of Systems  Constitutional, HEENT, cardiovascular, pulmonary, gi and gu systems are negative, except as otherwise noted.      Objective    /70   Pulse 75   Temp 97.9  F (36.6  C) (Tympanic)   Resp 18   Ht 1.613 m (5' 3.5\")   Wt 81.7 kg (180 lb 3.2 oz)   SpO2 100%   BMI 31.42 kg/m    Body mass index is 31.42 kg/m .      Physical Exam   GENERAL: alert and no distress  RESP: lungs clear to auscultation - no rales, rhonchi or wheezes  CV: regular rate and rhythm, normal S1 S2, no S3 or S4, no murmur, click or rub  PSYCH: mentation appears normal, affect normal/bright        Signed Electronically by: Evelin Cota, VIKI CNP    "

## 2024-02-22 NOTE — TELEPHONE ENCOUNTER
PRIOR AUTHORIZATION DENIED    Medication: OZEMPIC (0.25 OR 0.5 MG/DOSE) 2 MG/3ML SC SOPN  Insurance Company: [employer] - Phone [phone] Fax [phone]  Denial Date: 2/22/2024  Denial Reason(s): Needs to use formulary alternatives:      Appeal Information:   Patient Notified: No

## 2024-02-22 NOTE — PATIENT INSTRUCTIONS
Results for orders placed or performed in visit on 02/22/24   Hemoglobin A1c     Status: Abnormal   Result Value Ref Range    Hemoglobin A1C 11.4 (H) 0.0 - 5.6 %    Narrative    Results confirmed by repeat testing   CBC with platelets     Status: Normal   Result Value Ref Range    WBC Count 4.6 4.0 - 11.0 10e3/uL    RBC Count 4.66 3.80 - 5.20 10e6/uL    Hemoglobin 13.5 11.7 - 15.7 g/dL    Hematocrit 39.1 35.0 - 47.0 %    MCV 84 78 - 100 fL    MCH 29.0 26.5 - 33.0 pg    MCHC 34.5 31.5 - 36.5 g/dL    RDW 12.0 10.0 - 15.0 %    Platelet Count 312 150 - 450 10e3/uL

## 2024-02-23 NOTE — TELEPHONE ENCOUNTER
Called patient and her daughter.      Discussed insurance denial.      Previously tried Victoza (took for 3 months) - this wasn't helpful with blood sugars    Metformin - caused diarrhea.     Willing to trial Januvia, prescription sent to pharmacy.     - Maddison, CNP

## 2024-02-25 NOTE — RESULT ENCOUNTER NOTE
Court Moon,     -Normal red blood cell (hgb) levels, normal white blood cell count and normal platelet levels.  -Cholesterol levels are above goal levels.   ADVISE: continuing your medication, a regular exercise program with at least 150 minutes of aerobic exercise per week, and eating a low saturated fat/low carbohydrate diet.  Also, you should recheck this fasting cholesterol panel in 12 months.  -Liver and gallbladder tests (ALT,AST, Alk phos,bilirubin) are normal.  -Kidney function (GFR) is normal.  -Sodium is slightly decreased, but stable with previous values.    -Potassium is normal.  -Calcium is normal.  -Glucose is elevated due to your diabetes.  -Vitamin D level is normal and getting 1000 IU daily in your diet or supplements is recommended.   -Microalbumin (urine protein) level is elevated. This is suggestive of early damage to your kidneys from diabetes.  ADVISE: avoiding anti-inflamatory agents such as ibuprofen (Advil, Motrin) or naproxen (Aleve) as much as possible, keeping your blood pressure in a normal range, and continuing your medication (lisinopril) that helps protect your kidneys.  Also, this should be rechecked in 1 year.       Please send a [employer] message or call [phone]  if you have any questions.      Evelin Cota, VIKI, CNP  [facility] [place]    If you have further questions about the interpretation of your labs, [email or web address] is a good website to check out for further information.

## 2024-02-27 ENCOUNTER — TELEPHONE (OUTPATIENT)
Dept: FAMILY MEDICINE | Facility: CLINIC | Age: 54
End: 2024-02-27
Payer: COMMERCIAL

## 2024-02-27 NOTE — TELEPHONE ENCOUNTER
MTM referral from: [facility] visit (referral by provider)    MTM referral outreach attempt #3 on February 27, 2024 at 11:41 AM      Outcome: Patient not reachable after several attempts, will route to MTM Pharmacist/Provider as an FYI.  [facility] scheduling number is .  Thank you for the referral.    Use june juárez  for the carrier/Plan on the flowsheet      [employer] Message Sent    Denisse Monge - [facility]    [phone]

## 2024-02-28 ENCOUNTER — TRANSFERRED RECORDS (OUTPATIENT)
Dept: HEALTH INFORMATION MANAGEMENT | Facility: CLINIC | Age: 54
End: 2024-02-28
Payer: COMMERCIAL

## 2024-03-21 ENCOUNTER — OFFICE VISIT (OUTPATIENT)
Dept: FAMILY MEDICINE | Facility: CLINIC | Age: 54
End: 2024-03-21
Payer: COMMERCIAL

## 2024-03-21 VITALS
DIASTOLIC BLOOD PRESSURE: 70 MMHG | HEART RATE: 69 BPM | WEIGHT: 180 LBS | SYSTOLIC BLOOD PRESSURE: 130 MMHG | BODY MASS INDEX: 30.73 KG/M2 | HEIGHT: 64 IN | RESPIRATION RATE: 16 BRPM | OXYGEN SATURATION: 100 % | TEMPERATURE: 97.1 F

## 2024-03-21 DIAGNOSIS — Z79.4 TYPE 2 DIABETES MELLITUS WITH HYPERGLYCEMIA, WITH LONG-TERM CURRENT USE OF INSULIN (H): ICD-10-CM

## 2024-03-21 DIAGNOSIS — H25.9 AGE-RELATED CATARACT OF BOTH EYES, UNSPECIFIED AGE-RELATED CATARACT TYPE: ICD-10-CM

## 2024-03-21 DIAGNOSIS — Z01.818 PREOP EXAMINATION: Primary | ICD-10-CM

## 2024-03-21 DIAGNOSIS — E11.65 TYPE 2 DIABETES MELLITUS WITH HYPERGLYCEMIA, WITH LONG-TERM CURRENT USE OF INSULIN (H): ICD-10-CM

## 2024-03-21 PROCEDURE — 99212 OFFICE O/P EST SF 10 MIN: CPT | Performed by: NURSE PRACTITIONER

## 2024-03-21 NOTE — PROGRESS NOTES
Preoperative Evaluation  [address] [address]  Phone: [phone]  Primary Provider: Evelin Cota  Pre-op Performing Provider: MISSY ELAM  Mar 21, 2024       Sarai is a 54 year old, presenting for the following:  Pre-Op Exam        3/21/2024     2:45 PM   Additional Questions   Roomed by missy wood   Accompanied by son     Surgical Information  Surgery/Procedure: cataract  Surgery Location: [facility]  Surgeon: unknown  Surgery Date: 3/28/24 for right eye and 4/9 for left eye  Time of Surgery: 12pm  Where patient plans to recover: At home with family  Fax number for surgical facility: [phone]    Assessment & Plan     The proposed surgical procedure is considered LOW risk.    Preop examination    Age-related cataract of both eyes, unspecified age-related cataract type    Type 2 diabetes mellitus with hyperglycemia, with long-term current use of insulin (H)              - No identified additional risk factors other than previously addressed    Antiplatelet or Anticoagulation Medication Instructions   - Patient is on no antiplatelet or anticoagulation medications.    Additional Medication Instructions   - Long acting insulin (e.g. glargine, detemir): Take 80% of the usual evening or morning dose before surgery.    - DPP-4 Inhibitor (e.g. sitagliptin [Januvia], linagliptin [Tradjenta]): HOLD day of surgery.     Recommendation  APPROVAL GIVEN to proceed with proposed procedure, without further diagnostic evaluation.    Prescription drug management    Missy Elam CNP      Subjective       Via the Health Maintenance questionnaire, the patient has reported the following services have been completed -Cervical Cancer Screening, this information has been sent to the abstraction team.  HPI related to upcoming procedure: upcoming cataract surgery.    Diabetes has been in good control since med change. Fasting sugar 120-140         3/21/2024     2:43 PM   Preop Questions   1. Have you ever had a heart attack or stroke? No   2. Have you ever had surgery on your heart or blood vessels, such as a stent placement, a coronary artery bypass, or surgery on an artery in your head, neck, heart, or legs? No   3. Do you have chest pain with activity? No   4. Do you have a history of  heart failure? No   5. Do you currently have a cold, bronchitis or symptoms of other infection? No   6. Do you have a cough, shortness of breath, or wheezing? No   7. Do you or anyone in your family have previous history of blood clots? No   8. Do you or does anyone in your family have a serious bleeding problem such as prolonged bleeding following surgeries or cuts? No   9. Have you ever had problems with anemia or been told to take iron pills? No   10. Have you had any abnormal blood loss such as black, tarry or bloody stools, or abnormal vaginal bleeding? No   11. Have you ever had a blood transfusion? No   12. Are you willing to have a blood transfusion if it is medically needed before, during, or after your surgery? Yes   13. Have you or any of your relatives ever had problems with anesthesia? No   14. Do you have sleep apnea, excessive snoring or daytime drowsiness? No   15. Do you have any artifical heart valves or other implanted medical devices like a pacemaker, defibrillator, or continuous glucose monitor? No   16. Do you have artificial joints? No   17. Are you allergic to latex? No   18. Is there any chance that you may be pregnant? No       Health Care Directive  Patient does not have a Health Care Directive or Living Will: Discussed advance care planning with patient; however, patient declined at this time.    Preoperative Review of    reviewed - no record of controlled substances prescribed.  \  Status of Chronic Conditions:  DIABETES - Patient has a longstanding history of DiabetesType Type II . Patient is being treated with oral agents and insulin  injections and denies significant side effects. Control has been fair. Complicating factors include but are not limited to: hyperlipidemia.     Patient Active Problem List    Diagnosis Date Noted    Type 2 diabetes mellitus with hyperglycemia, with long-term current use of insulin (H) 10/04/2023     Priority: Medium    History of gestational diabetes 10/04/2023     Priority: Medium      Past Medical History:   Diagnosis Date    Diabetes (H)      No past surgical history on file.  Current Outpatient Medications   Medication Sig Dispense Refill    atorvastatin (LIPITOR) 40 MG tablet Take 1 tablet (40 mg) by mouth daily 90 tablet 1    blood glucose (NO BRAND SPECIFIED) lancets standard Use to test blood sugar 3 times daily or as directed. 100 Lancet 0    blood glucose (NO BRAND SPECIFIED) test strip Use to test blood sugar 3 times daily or as directed. 100 strip 0    blood glucose monitoring (NO BRAND SPECIFIED) meter device kit Use to test blood sugar 3 times daily or as directed. 1 kit 0    Blood Pressure Monitor KIT 1 Device daily as needed (BP check) 1 kit 0    Calcium Carbonate-Vitamin D 300-2.5 MG-MCG CAPS Take 1 capsule by mouth 2 times daily 180 capsule 3    cetirizine (ZYRTEC) 10 MG tablet Take 1 tablet (10 mg) by mouth as needed for allergies 90 tablet 3    fluticasone (FLONASE) 50 MCG/ACT nasal spray Spray 1 spray into both nostrils daily      insulin aspart (NOVOLOG PEN) 100 UNIT/ML pen Inject 5 Units Subcutaneous 3 times daily (with meals) 15 mL 5    insulin glargine (LANTUS PEN) 100 UNIT/ML pen Inject 35 Units Subcutaneous at bedtime 45 mL 5    insulin pen needle (32G X 6 MM) 32G X 6 MM miscellaneous Use 4 pen needles daily or as directed. 100 each 0    lisinopril (ZESTRIL) 2.5 MG tablet Take 1 tablet (2.5 mg) by mouth daily 90 tablet 1    semaglutide (OZEMPIC) 2 MG/3ML pen Inject 0.5 mg Subcutaneous every 7 days 3 mL 3    sitagliptin (JANUVIA) 50 MG tablet Take 1 tablet (50 mg) by mouth daily 90 tablet 1  "      Allergies   Allergen Reactions    Fish-Derived Products     Pork Gelatin [Pork (Porcine) Protein]      Not allowed due to Nondenominational culture         Social History     Tobacco Use    Smoking status: Never    Smokeless tobacco: Never   Substance Use Topics    Alcohol use: Never     Family History   Problem Relation Age of Onset    Diabetes Mother         Risa    Diabetes Brother         Taryn     History   Drug Use Unknown         Review of Systems    Review of Systems  Constitutional, HEENT, cardiovascular, pulmonary, GI, , musculoskeletal, neuro, skin, endocrine and psych systems are negative, except as otherwise noted.    Objective    /70   Pulse 69   Temp 97.1  F (36.2  C)   Resp 16   Ht 1.613 m (5' 3.5\")   Wt 81.6 kg (180 lb)   SpO2 100%   BMI 31.39 kg/m     Estimated body mass index is 31.39 kg/m  as calculated from the following:    Height as of this encounter: 1.613 m (5' 3.5\").    Weight as of this encounter: 81.6 kg (180 lb).  Physical Exam  GENERAL: alert and no distress  EYES: Eyes grossly normal to inspection, PERRL and conjunctivae and sclerae normal  HENT: ear canals and TM's normal, nose and mouth without ulcers or lesions  NECK: no adenopathy, no asymmetry, masses, or scars  RESP: lungs clear to auscultation - no rales, rhonchi or wheezes  CV: regular rate and rhythm, normal S1 S2, no S3 or S4, no murmur, click or rub, no peripheral edema  ABDOMEN: soft, nontender, no hepatosplenomegaly, no masses and bowel sounds normal  MS: no gross musculoskeletal defects noted, no edema  SKIN: no suspicious lesions or rashes  NEURO: Normal strength and tone, mentation intact and speech normal  PSYCH: mentation appears normal, affect normal/bright    Recent Labs   Lab Test 02/22/24  1359 10/23/23  1604   HGB 13.5 12.7    256   * 134*   POTASSIUM 4.7 4.9   CR 0.58 0.51   A1C 11.4* 11.9*        Diagnostics  No labs were ordered during this visit.   No EKG required, no history of " coronary heart disease, significant arrhythmia, peripheral arterial disease or other structural heart disease.    Revised Cardiac Risk Index (RCRI)  The patient has the following serious cardiovascular risks for perioperative complications:   - No serious cardiac risks = 0 points     RCRI Interpretation: 0 points: Class I (very low risk - 0.4% complication rate)         Signed Electronically by: Missy Elam CNP  Copy of this evaluation report is provided to requesting physician.

## 2024-03-25 ENCOUNTER — TELEPHONE (OUTPATIENT)
Dept: FAMILY MEDICINE | Facility: CLINIC | Age: 54
End: 2024-03-25
Payer: COMMERCIAL

## 2024-03-25 NOTE — TELEPHONE ENCOUNTER
Prior Authorization Retail Medication Request    Medication/Dose: Januvia 50MG  Diagnosis and ICD code (if different than what is on RX):  NA  New/renewal/insurance change PA/secondary ins. PA:  Previously Tried and Failed:  NA  Rationale:  NA    Insurance   Primary: Silvino KIM  Insurance ID:  [id number]         Pharmacy Information (if different than what is on RX)  Name:  [facility]  Phone:  [phone]   Fax:[phone]

## 2024-04-05 NOTE — TELEPHONE ENCOUNTER
Central Prior Authorization Team   Phone: [phone]    PA Initiation    Medication: Januvia 50MG  Insurance Company: [employer] - Phone [phone] Fax [phone]  Pharmacy Filling the Rx: [id number] - [facility] [address] [facility] [address]  Filling Pharmacy Phone: [phone]  Filling Pharmacy Fax:    Start Date: 4/5/2024

## 2024-04-06 NOTE — TELEPHONE ENCOUNTER
Prior Authorization Approval    Authorization Effective Date:    Authorization Expiration Date: 4/4/2025  Medication: Januvia 50MG  Approved Dose/Quantity:    Reference #:     Insurance Company: [employer] - Phone [phone] Fax [phone]  Expected CoPay:       CoPay Card Available:      Foundation Assistance Needed:    Which Pharmacy is filling the prescription (Not needed for infusion/clinic administered): [id number] - [facility] [address] AT [place] [address] & [address]  Pharmacy Notified:  Yes  Patient Notified:  **Instructed pharmacy to notify patient when script is ready to /ship.**

## 2024-05-02 ENCOUNTER — VIRTUAL VISIT (OUTPATIENT)
Dept: FAMILY MEDICINE | Facility: CLINIC | Age: 54
End: 2024-05-02
Payer: COMMERCIAL

## 2024-05-02 DIAGNOSIS — Z79.4 TYPE 2 DIABETES MELLITUS WITH HYPERGLYCEMIA, WITH LONG-TERM CURRENT USE OF INSULIN (H): Primary | ICD-10-CM

## 2024-05-02 DIAGNOSIS — I10 BENIGN ESSENTIAL HYPERTENSION: ICD-10-CM

## 2024-05-02 DIAGNOSIS — E11.65 TYPE 2 DIABETES MELLITUS WITH HYPERGLYCEMIA, WITH LONG-TERM CURRENT USE OF INSULIN (H): Primary | ICD-10-CM

## 2024-05-02 PROCEDURE — 99214 OFFICE O/P EST MOD 30 MIN: CPT | Mod: 95 | Performed by: NURSE PRACTITIONER

## 2024-05-02 RX ORDER — LISINOPRIL 5 MG/1
5 TABLET ORAL DAILY
Qty: 90 TABLET | Refills: 1 | Status: SHIPPED | OUTPATIENT
Start: 2024-05-02 | End: 2024-09-24

## 2024-05-02 RX ORDER — PREDNISOLONE ACETATE 10 MG/ML
SUSPENSION/ DROPS OPHTHALMIC
COMMUNITY
Start: 2024-04-05 | End: 2024-09-24

## 2024-05-02 RX ORDER — MOXIFLOXACIN 5 MG/ML
SOLUTION/ DROPS OPHTHALMIC
COMMUNITY
Start: 2024-04-05 | End: 2024-09-24

## 2024-05-02 RX ORDER — KETOROLAC TROMETHAMINE 5 MG/ML
SOLUTION OPHTHALMIC
COMMUNITY
Start: 2024-04-05 | End: 2024-09-24

## 2024-05-02 NOTE — PROGRESS NOTES
"Sarai is a 54 year old who is being evaluated via a billable video visit.    How would you like to obtain your AVS? MyChart  If the video visit is dropped, the invitation should be resent by: Text to cell phone: [phone]  Will anyone else be joining your video visit? No    Assessment & Plan     Sarai was seen today for recheck medication.    Diagnoses and all orders for this visit:    Type 2 diabetes mellitus with hyperglycemia, with long-term current use of insulin (H)  Hemoglobin A1C   Date Value Ref Range Status   02/22/2024 11.4 (H) 0.0 - 5.6 % Final   10/23/2023 11.9 (H) <5.7 % Final     Comment:     Normal <5.7%   Prediabetes 5.7-6.4%    Diabetes 6.5% or higher     Note: Adopted from ADA consensus guidelines.   Continued uncontrolled Diabetes with elevated blood sugars.  Plan increase in Januvia to 100 mg daily and Novolog to 7 units three times daily.  Continue with Lantus 35 units once daily.    Encouraged close follow-up with PharmD and myself in 3-4 weeks for recheck in clinic.    -     semaglutide (OZEMPIC) 2 MG/3ML pen; Inject 0.5 mg Subcutaneous every 7 days  -     insulin aspart (NOVOLOG PEN) 100 UNIT/ML pen; Inject 7 Units Subcutaneous 3 times daily (with meals)  -     sitagliptin (JANUVIA) 100 MG tablet; Take 1 tablet (100 mg) by mouth daily    Benign essential hypertension  -     lisinopril (ZESTRIL) 5 MG tablet; Take 1 tablet (5 mg) by mouth daily        BMI  Estimated body mass index is 31.39 kg/m  as calculated from the following:    Height as of 3/21/24: 1.613 m (5' 3.5\").    Weight as of 3/21/24: 81.6 kg (180 lb).     Return in about 3 weeks (around 5/23/2024) for Diabetes Follow-up, In Clinic.      Subjective   Sarai is a 54 year old, presenting for the following health issues:  Recheck Medication        5/2/2024    12:12 PM   Additional Questions   Roomed by Bette LINK     Video Start Time: 12:44 PM  History of Present Illness     Hemoglobin A1C   Date Value Ref Range Status   02/22/2024 11.4 " (H) 0.0 - 5.6 % Final   10/23/2023 11.9 (H) <5.7 % Final     Comment:     Normal <5.7%   Prediabetes 5.7-6.4%    Diabetes 6.5% or higher     Note: Adopted from ADA consensus guidelines.     Blood sugar of almost 500 last evening.  Took 5 units of insulin and then blood sugar went down to 300.      Has been unable to get Ozempic.      Diabetes:   She presents for follow up of diabetes.  She is checking home blood glucose three times daily.   She checks blood glucose before meals and at bedtime.  Blood glucose is sometimes over 200 and sometimes under 70. She is aware of hypoglycemia symptoms including dizziness, weakness and blurred vision.   She is concerned about blood sugar frequently over 200.   She is having excessive thirst, blurry vision and weight gain.            She eats 2-3 servings of fruits and vegetables daily.She consumes 2 sweetened beverage(s) daily.She exercises with enough effort to increase her heart rate 10 to 19 minutes per day.  She exercises with enough effort to increase her heart rate 3 or less days per week. She is missing 1 dose(s) of medications per week.  She is not taking prescribed medications regularly due to remembering to take.       Hyperlipidemia Follow-Up    Are you regularly taking any medication or supplement to lower your cholesterol?   Yes-    Are you having muscle aches or other side effects that you think could be caused by your cholesterol lowering medication?  No    Hypertension Follow-up    Do you check your blood pressure regularly outside of the clinic? Yes   Are you following a low salt diet? Yes  Are your blood pressures ever more than 140 on the top number (systolic) OR more   than 90 on the bottom number (diastolic), for example 140/90? Yes      Review of Systems  Constitutional, HEENT, cardiovascular, pulmonary, gi and gu systems are negative, except as otherwise noted.      Objective    Vitals - Patient Reported  Systolic (Patient Reported): (!) 155  Diastolic  (Patient Reported): 71  Weight (Patient Reported): 82.1 kg (181 lb)      Vitals:  No vitals were obtained today due to virtual visit.    Physical Exam   GENERAL: alert and no distress  EYES: Eyes grossly normal to inspection.  No discharge or erythema, or obvious scleral/conjunctival abnormalities.  RESP: No audible wheeze, cough, or visible cyanosis.    SKIN: Visible skin clear. No significant rash, abnormal pigmentation or lesions.  NEURO: Cranial nerves grossly intact.  Mentation and speech appropriate for age.  PSYCH: Appropriate affect, tone, and pace of words          Video-Visit Details    Type of service:  Video Visit   Video End Time:12:59 PM  Originating Location (pt. Location): Home  Distant Location (provider location):  On-site  Platform used for Video Visit: William      Signed Electronically by: VIKI Rushing CNP

## 2024-05-06 ENCOUNTER — E-VISIT (OUTPATIENT)
Dept: FAMILY MEDICINE | Facility: CLINIC | Age: 54
End: 2024-05-06
Payer: COMMERCIAL

## 2024-05-06 DIAGNOSIS — Z79.4 TYPE 2 DIABETES MELLITUS WITH HYPERGLYCEMIA, WITH LONG-TERM CURRENT USE OF INSULIN (H): Primary | ICD-10-CM

## 2024-05-06 DIAGNOSIS — E11.65 TYPE 2 DIABETES MELLITUS WITH HYPERGLYCEMIA, WITH LONG-TERM CURRENT USE OF INSULIN (H): Primary | ICD-10-CM

## 2024-05-06 PROCEDURE — 99207 PR NON-BILLABLE SERV PER CHARTING: CPT | Performed by: NURSE PRACTITIONER

## 2024-05-06 NOTE — TELEPHONE ENCOUNTER
Provider E-Visit time total (minutes): 1    Not appropriate for E-Visit.  Will send to  to check on Semaglutide status.      - Maddison, CNP

## 2024-05-07 NOTE — TELEPHONE ENCOUNTER
Prior authorization for ozempic is currently in process. [employer] message sent to patient to notify.     Placed call to pharmacy to check if rx is in stock. Pharmacy reports rx is in stock, and just waiting for PA to approved.

## 2024-05-24 ENCOUNTER — OFFICE VISIT (OUTPATIENT)
Dept: FAMILY MEDICINE | Facility: CLINIC | Age: 54
End: 2024-05-24
Payer: COMMERCIAL

## 2024-05-24 VITALS
TEMPERATURE: 98.1 F | HEART RATE: 74 BPM | BODY MASS INDEX: 30.51 KG/M2 | HEIGHT: 64 IN | OXYGEN SATURATION: 98 % | SYSTOLIC BLOOD PRESSURE: 118 MMHG | DIASTOLIC BLOOD PRESSURE: 70 MMHG | RESPIRATION RATE: 16 BRPM | WEIGHT: 178.7 LBS

## 2024-05-24 DIAGNOSIS — E11.65 TYPE 2 DIABETES MELLITUS WITH HYPERGLYCEMIA, WITH LONG-TERM CURRENT USE OF INSULIN (H): Primary | ICD-10-CM

## 2024-05-24 DIAGNOSIS — Z79.4 TYPE 2 DIABETES MELLITUS WITH HYPERGLYCEMIA, WITH LONG-TERM CURRENT USE OF INSULIN (H): Primary | ICD-10-CM

## 2024-05-24 LAB
GLUCOSE BLD-MCNC: 250 MG/DL (ref 60–99)
HBA1C MFR BLD: 12.3 % (ref 0–5.6)

## 2024-05-24 PROCEDURE — 83036 HEMOGLOBIN GLYCOSYLATED A1C: CPT | Performed by: NURSE PRACTITIONER

## 2024-05-24 PROCEDURE — 99214 OFFICE O/P EST MOD 30 MIN: CPT | Performed by: NURSE PRACTITIONER

## 2024-05-24 PROCEDURE — 82947 ASSAY GLUCOSE BLOOD QUANT: CPT | Performed by: NURSE PRACTITIONER

## 2024-05-24 PROCEDURE — 36415 COLL VENOUS BLD VENIPUNCTURE: CPT | Performed by: NURSE PRACTITIONER

## 2024-05-24 NOTE — PROGRESS NOTES
"  Assessment & Plan     Sarai was seen today for diabetes.    Diagnoses and all orders for this visit:    Type 2 diabetes mellitus with hyperglycemia, with long-term current use of insulin (H)  Hemoglobin A1C   Date Value Ref Range Status   02/22/2024 11.4 (H) 0.0 - 5.6 % Final   10/23/2023 11.9 (H) <5.7 % Final     Comment:     Normal <5.7%   Prediabetes 5.7-6.4%    Diabetes 6.5% or higher     Note: Adopted from ADA consensus guidelines.   Uncontrolled Diabetes.  HgbA1C pending.  Fasting glucose of 250 at today's visit.    Patient stopped Novolog when she started Semaglutide and recommended restart of meal-time insulin and to maintain current dose of Lantus at 35 units every day.  Will continue to titrate up Semaglutide, plan increase to 1 mg once weekly after 4 weeks of 0.5 mg dose.    Encouraged MTM/PharmD consultation.    Close follow-up in 3  months for recheck.    -     blood glucose (NO BRAND SPECIFIED) test strip; Use to test blood sugar 3 times daily or as directed.  -     Semaglutide, 1 MG/DOSE, (OZEMPIC) 4 MG/3ML pen; Inject 1 mg Subcutaneous every 7 days  -     Glucose, whole blood  -     Hemoglobin A1c      BMI  Estimated body mass index is 31.16 kg/m  as calculated from the following:    Height as of this encounter: 1.613 m (5' 3.5\").    Weight as of this encounter: 81.1 kg (178 lb 11.2 oz).       Return in about 3 months (around 8/24/2024) for Diabetes Follow-up, In Clinic.      Subjective   Sarai is a 54 year old, presenting for the following health issues:  Diabetes        5/24/2024    11:25 AM   Additional Questions   Roomed by Bette LINK      History of Present Illness       Last seen on 5/2/24.    Januvia increased to 100 mg daily and Novolog to 7 units with each meal.  Lantus at 35 units.    Hemoglobin A1C   Date Value Ref Range Status   02/22/2024 11.4 (H) 0.0 - 5.6 % Final   10/23/2023 11.9 (H) <5.7 % Final     Comment:     Normal <5.7%   Prediabetes 5.7-6.4%    Diabetes 6.5% or higher     Note: " "Adopted from ADA consensus guidelines.     Semaglutide 0.5 mg once weekly - started on Monday (first dose).    Stopped Januvia.   Lantus 34 units daily.    Novolog - stopped this when she started Semaglutide as well.      Fasting blood sugars:  240's.       Diabetes:   She presents for follow up of diabetes.  She is checking home blood glucose four or more times daily.   She checks blood glucose before and after meals and at bedtime.  Blood glucose is sometimes over 200 and sometimes under 70. She is aware of hypoglycemia symptoms including shakiness, dizziness, weakness and blurred vision.   She is concerned about blood sugar frequently over 200.   She is having excessive thirst, blurry vision and weight gain.            She eats 4 or more servings of fruits and vegetables daily.She consumes 3 sweetened beverage(s) daily.She exercises with enough effort to increase her heart rate 9 or less minutes per day.  She exercises with enough effort to increase her heart rate 3 or less days per week.   She is taking medications regularly.          Review of Systems  Constitutional, HEENT, cardiovascular, pulmonary, gi and gu systems are negative, except as otherwise noted.      Objective    /70   Pulse 74   Temp 98.1  F (36.7  C) (Oral)   Resp 16   Ht 1.613 m (5' 3.5\")   Wt 81.1 kg (178 lb 11.2 oz)   SpO2 98%   BMI 31.16 kg/m    Body mass index is 31.16 kg/m .  Physical Exam     GENERAL: alert and no distress  RESP: lungs clear to auscultation - no rales, rhonchi or wheezes  CV: regular rate and rhythm, normal S1 S2, no S3 or S4, no murmur, click or rub, no peripheral edema  PSYCH: mentation appears normal, affect normal/bright      Signed Electronically by: Evelin Cota, VIKI CNP    "

## 2024-05-28 NOTE — RESULT ENCOUNTER NOTE
Dear Sarai,       -A1C (test of diabetes control the last 2-3 months) has continued to increase.  I am hopeful that with continued insulin and the start of Semaglutide we can continue to adjust your medications to help with diabetes control.     Please follow-up with the pharmacist as previously recommended for close monitoring of your Diabetes control.        Thank you,     Evelin Cota, VIKI, CNP  [facility] [place]

## 2024-07-08 ENCOUNTER — TRANSFERRED RECORDS (OUTPATIENT)
Dept: HEALTH INFORMATION MANAGEMENT | Facility: CLINIC | Age: 54
End: 2024-07-08
Payer: COMMERCIAL

## 2024-07-08 LAB — RETINOPATHY: POSITIVE

## 2024-09-17 ENCOUNTER — PATIENT OUTREACH (OUTPATIENT)
Dept: CARE COORDINATION | Facility: CLINIC | Age: 54
End: 2024-09-17
Payer: COMMERCIAL

## 2024-09-24 ENCOUNTER — OFFICE VISIT (OUTPATIENT)
Dept: FAMILY MEDICINE | Facility: CLINIC | Age: 54
End: 2024-09-24
Payer: COMMERCIAL

## 2024-09-24 VITALS
OXYGEN SATURATION: 100 % | HEART RATE: 67 BPM | BODY MASS INDEX: 29.93 KG/M2 | RESPIRATION RATE: 16 BRPM | HEIGHT: 63 IN | DIASTOLIC BLOOD PRESSURE: 62 MMHG | WEIGHT: 168.9 LBS | SYSTOLIC BLOOD PRESSURE: 112 MMHG | TEMPERATURE: 96.9 F

## 2024-09-24 DIAGNOSIS — Z12.4 CERVICAL CANCER SCREENING: ICD-10-CM

## 2024-09-24 DIAGNOSIS — E11.65 TYPE 2 DIABETES MELLITUS WITH HYPERGLYCEMIA, WITH LONG-TERM CURRENT USE OF INSULIN (H): Primary | ICD-10-CM

## 2024-09-24 DIAGNOSIS — Z79.4 TYPE 2 DIABETES MELLITUS WITH HYPERGLYCEMIA, WITH LONG-TERM CURRENT USE OF INSULIN (H): Primary | ICD-10-CM

## 2024-09-24 DIAGNOSIS — I10 BENIGN ESSENTIAL HYPERTENSION: ICD-10-CM

## 2024-09-24 LAB
EST. AVERAGE GLUCOSE BLD GHB EST-MCNC: 209 MG/DL
HBA1C MFR BLD: 8.9 % (ref 0–5.6)

## 2024-09-24 PROCEDURE — 87624 HPV HI-RISK TYP POOLED RSLT: CPT | Performed by: NURSE PRACTITIONER

## 2024-09-24 PROCEDURE — 83036 HEMOGLOBIN GLYCOSYLATED A1C: CPT | Performed by: NURSE PRACTITIONER

## 2024-09-24 PROCEDURE — 90471 IMMUNIZATION ADMIN: CPT | Performed by: NURSE PRACTITIONER

## 2024-09-24 PROCEDURE — 90673 RIV3 VACCINE NO PRESERV IM: CPT | Performed by: NURSE PRACTITIONER

## 2024-09-24 PROCEDURE — G0145 SCR C/V CYTO,THINLAYER,RESCR: HCPCS | Performed by: NURSE PRACTITIONER

## 2024-09-24 PROCEDURE — 99214 OFFICE O/P EST MOD 30 MIN: CPT | Mod: 25 | Performed by: NURSE PRACTITIONER

## 2024-09-24 PROCEDURE — 36415 COLL VENOUS BLD VENIPUNCTURE: CPT | Performed by: NURSE PRACTITIONER

## 2024-09-24 RX ORDER — ATORVASTATIN CALCIUM 40 MG/1
40 TABLET, FILM COATED ORAL DAILY
Qty: 90 TABLET | Refills: 1 | Status: SHIPPED | OUTPATIENT
Start: 2024-09-24

## 2024-09-24 RX ORDER — LISINOPRIL 5 MG/1
2.5 TABLET ORAL DAILY
Qty: 135 TABLET | Refills: 1 | Status: SHIPPED | OUTPATIENT
Start: 2024-09-24 | End: 2024-09-24

## 2024-09-24 RX ORDER — LISINOPRIL 5 MG/1
2.5 TABLET ORAL DAILY
Qty: 45 TABLET | Refills: 3 | Status: SHIPPED | OUTPATIENT
Start: 2024-09-24

## 2024-09-24 NOTE — PATIENT INSTRUCTIONS
Hemoglobin A1C   Date Value Ref Range Status   09/24/2024 8.9 (H) 0.0 - 5.6 % Final     Comment:     Normal <5.7%   Prediabetes 5.7-6.4%    Diabetes 6.5% or higher     Note: Adopted from ADA consensus guidelines.   05/24/2024 12.3 (H) 0.0 - 5.6 % Final     Comment:     Normal <5.7%   Prediabetes 5.7-6.4%    Diabetes 6.5% or higher     Note: Adopted from ADA consensus guidelines.   02/22/2024 11.4 (H) 0.0 - 5.6 % Final

## 2024-09-24 NOTE — PROGRESS NOTES
"  Assessment & Plan   Sarai was seen today for diabetes.    Diagnoses and all orders for this visit:    Type 2 diabetes mellitus with hyperglycemia, with long-term current use of insulin (H)  Hemoglobin A1C   Date Value Ref Range Status   09/24/2024 8.9 (H) 0.0 - 5.6 % Final     Comment:     Normal <5.7%   Prediabetes 5.7-6.4%    Diabetes 6.5% or higher     Note: Adopted from ADA consensus guidelines.   05/24/2024 12.3 (H) 0.0 - 5.6 % Final     Comment:     Normal <5.7%   Prediabetes 5.7-6.4%    Diabetes 6.5% or higher     Note: Adopted from ADA consensus guidelines.   02/22/2024 11.4 (H) 0.0 - 5.6 % Final   Significant improvement in Diabetes control, continue to work to get to goal HgbA1C of less than 7%. Plan to continue Lantus, 32 units daily and increase Semaglutide to 2 mg once weekly.   Close follow-up in 3 months, sooner as needed.    -     HEMOGLOBIN A1C  -     insulin glargine (LANTUS PEN) 100 UNIT/ML pen; Inject 32 Units subcutaneously at bedtime.  -     atorvastatin (LIPITOR) 40 MG tablet; Take 1 tablet (40 mg) by mouth daily.  -     Semaglutide, 2 MG/DOSE, (OZEMPIC) 8 MG/3ML pen; Inject 2 mg subcutaneously every 7 days.    Benign essential hypertension  Stable on Lisinopril 2.5 mg once daily.    -     lisinopril (ZESTRIL) 5 MG tablet; Take 0.5 tablets (2.5 mg) by mouth daily.    Cervical cancer screening  -     HPV and Gynecologic Cytology Panel - Recommended Age 30 - 65 Years    Other orders  -     INFLUENZA VACCINE TRIVALENT(FLUBLOK)  -     REVIEW OF HEALTH MAINTENANCE PROTOCOL ORDERS          BMI  Estimated body mass index is 29.92 kg/m  as calculated from the following:    Height as of this encounter: 1.6 m (5' 3\").    Weight as of this encounter: 76.6 kg (168 lb 14.4 oz).     Return in about 3 months (around 12/24/2024) for Diabetes Follow-up, In Clinic.    The longitudinal plan of care for the diagnosis(es)/condition(s) as documented were addressed during this visit. Due to the added complexity in " care, I will continue to support Sarai in the subsequent management and with ongoing continuity of care.    Subjective   Sarai is a 54 year old, presenting for the following health issues:  Diabetes (Check up)        9/24/2024     1:08 PM   Additional Questions   Roomed by chloé john   Accompanied by self     History of Present Illness       Diabetes:   She presents for follow up of diabetes.  She is checking home blood glucose two times daily.   She checks blood glucose before meals and at bedtime.  Blood glucose is sometimes over 200 and sometimes under 70. She is aware of hypoglycemia symptoms including other.    She has no concerns regarding her diabetes at this time.  She is having burning in feet.            Hyperlipidemia:  She presents for follow up of hyperlipidemia.   She is taking medication to lower cholesterol. She is not having myalgia or other side effects to statin medications.    Hypertension: She presents for follow up of hypertension.  She does check blood pressure  regularly outside of the clinic. Outpatient blood pressures have not been over 140/90. She follows a low salt diet.     She eats 2-3 servings of fruits and vegetables daily.She consumes 0 sweetened beverage(s) daily.She exercises with enough effort to increase her heart rate 9 or less minutes per day.  She exercises with enough effort to increase her heart rate 3 or less days per week.   She is taking medications regularly.     Checking blood sugar every morning, ranging from 's.   Is taking Lantus 32 units daily and Ozempic 1 mg once weekly.  Stopped meal-time insulin 1-2 months ago.    Doesn't check blood sugars in the afternoons.    Is feeling good.    Feet used to be hot and this has resolved.     Following with [facility]. Has noticed an improvement, swelling was present.     No history of abnormal pap smears.   Will plan pap and HPV co-tests every 5 years.     Review of Systems  Constitutional, HEENT, cardiovascular,  "pulmonary, gi and gu systems are negative, except as otherwise noted.        Objective    /62   Pulse 67   Temp 96.9  F (36.1  C) (Tympanic)   Resp 16   Ht 1.6 m (5' 3\")   Wt 76.6 kg (168 lb 14.4 oz)   SpO2 100%   BMI 29.92 kg/m    Body mass index is 29.92 kg/m .    Physical Exam     GENERAL: alert and no distress  RESP: lungs clear to auscultation - no rales, rhonchi or wheezes  CV: regular rate and rhythm, normal S1 S2, no S3 or S4, no murmur, click or rub, no peripheral edema   (female): normal female external genitalia, normal urethral meatus, normal vaginal mucosa  PSYCH: mentation appears normal, affect normal/bright            Signed Electronically by: Evelin Cota, VIKI CNP    "

## 2024-09-26 LAB
HPV HR 12 DNA CVX QL NAA+PROBE: NEGATIVE
HPV16 DNA CVX QL NAA+PROBE: NEGATIVE
HPV18 DNA CVX QL NAA+PROBE: NEGATIVE
HUMAN PAPILLOMA VIRUS FINAL DIAGNOSIS: NORMAL

## 2024-09-27 LAB
BKR AP ASSOCIATED HPV REPORT: NORMAL
BKR LAB AP GYN ADEQUACY: NORMAL
BKR LAB AP GYN INTERPRETATION: NORMAL
BKR LAB AP PREVIOUS ABNORMAL: NORMAL
PATH REPORT.COMMENTS IMP SPEC: NORMAL
PATH REPORT.COMMENTS IMP SPEC: NORMAL
PATH REPORT.RELEVANT HX SPEC: NORMAL

## 2024-10-01 ENCOUNTER — PATIENT OUTREACH (OUTPATIENT)
Dept: CARE COORDINATION | Facility: CLINIC | Age: 54
End: 2024-10-01
Payer: COMMERCIAL

## 2024-10-04 ENCOUNTER — TRANSFERRED RECORDS (OUTPATIENT)
Dept: MULTI SPECIALTY CLINIC | Facility: CLINIC | Age: 54
End: 2024-10-04

## 2024-10-24 ENCOUNTER — MYC MEDICAL ADVICE (OUTPATIENT)
Dept: FAMILY MEDICINE | Facility: CLINIC | Age: 54
End: 2024-10-24
Payer: COMMERCIAL

## 2024-10-24 NOTE — TELEPHONE ENCOUNTER
General Call    Contacts       Contact Date/Time Type Contact Phone/Fax    10/24/2024 04:21 PM CDT Phone (Outgoing) Sarai Saunders (Self) [phone] (M)    Left Message           Reason for Call:  12/8/24 diabetes check needs to be reschedule    What are your questions or concerns:  PCP out of clinic 12/8/24    Date of last appointment with provider: 9/24/24

## 2024-11-08 ENCOUNTER — MYC REFILL (OUTPATIENT)
Dept: FAMILY MEDICINE | Facility: CLINIC | Age: 54
End: 2024-11-08
Payer: COMMERCIAL

## 2024-11-08 DIAGNOSIS — Z79.4 TYPE 2 DIABETES MELLITUS WITH HYPERGLYCEMIA, WITH LONG-TERM CURRENT USE OF INSULIN (H): ICD-10-CM

## 2024-11-08 DIAGNOSIS — E11.65 TYPE 2 DIABETES MELLITUS WITH HYPERGLYCEMIA, WITH LONG-TERM CURRENT USE OF INSULIN (H): ICD-10-CM

## 2024-11-11 DIAGNOSIS — Z79.4 TYPE 2 DIABETES MELLITUS WITH HYPERGLYCEMIA, WITH LONG-TERM CURRENT USE OF INSULIN (H): ICD-10-CM

## 2024-11-11 DIAGNOSIS — E11.65 TYPE 2 DIABETES MELLITUS WITH HYPERGLYCEMIA, WITH LONG-TERM CURRENT USE OF INSULIN (H): ICD-10-CM

## 2024-11-11 RX ORDER — INSULIN GLARGINE 100 [IU]/ML
INJECTION, SOLUTION SUBCUTANEOUS
Qty: 45 ML | Refills: 5 | OUTPATIENT
Start: 2024-11-11

## 2024-12-15 ENCOUNTER — HEALTH MAINTENANCE LETTER (OUTPATIENT)
Age: 54
End: 2024-12-15

## 2024-12-18 ENCOUNTER — PATIENT OUTREACH (OUTPATIENT)
Dept: CARE COORDINATION | Facility: CLINIC | Age: 54
End: 2024-12-18

## 2025-01-16 ENCOUNTER — ORDERS ONLY (AUTO-RELEASED) (OUTPATIENT)
Dept: FAMILY MEDICINE | Facility: CLINIC | Age: 55
End: 2025-01-16

## 2025-01-16 ENCOUNTER — ANCILLARY PROCEDURE (OUTPATIENT)
Dept: GENERAL RADIOLOGY | Facility: CLINIC | Age: 55
End: 2025-01-16
Attending: NURSE PRACTITIONER
Payer: COMMERCIAL

## 2025-01-16 ENCOUNTER — OFFICE VISIT (OUTPATIENT)
Dept: FAMILY MEDICINE | Facility: CLINIC | Age: 55
End: 2025-01-16
Payer: COMMERCIAL

## 2025-01-16 VITALS
TEMPERATURE: 96.3 F | BODY MASS INDEX: 28.53 KG/M2 | HEIGHT: 63 IN | SYSTOLIC BLOOD PRESSURE: 110 MMHG | OXYGEN SATURATION: 99 % | HEART RATE: 70 BPM | WEIGHT: 161 LBS | DIASTOLIC BLOOD PRESSURE: 84 MMHG | RESPIRATION RATE: 18 BRPM

## 2025-01-16 DIAGNOSIS — Z11.59 NEED FOR HEPATITIS C SCREENING TEST: ICD-10-CM

## 2025-01-16 DIAGNOSIS — Z12.11 SPECIAL SCREENING FOR MALIGNANT NEOPLASMS, COLON: ICD-10-CM

## 2025-01-16 DIAGNOSIS — I10 BENIGN ESSENTIAL HYPERTENSION: ICD-10-CM

## 2025-01-16 DIAGNOSIS — Z00.00 ROUTINE GENERAL MEDICAL EXAMINATION AT A HEALTH CARE FACILITY: Primary | ICD-10-CM

## 2025-01-16 DIAGNOSIS — Z79.4 TYPE 2 DIABETES MELLITUS WITH HYPERGLYCEMIA, WITH LONG-TERM CURRENT USE OF INSULIN (H): ICD-10-CM

## 2025-01-16 DIAGNOSIS — M54.16 LUMBAR BACK PAIN WITH RADICULOPATHY AFFECTING LEFT LOWER EXTREMITY: ICD-10-CM

## 2025-01-16 DIAGNOSIS — Z11.4 SCREENING FOR HIV (HUMAN IMMUNODEFICIENCY VIRUS): ICD-10-CM

## 2025-01-16 DIAGNOSIS — Z00.00 HEALTHCARE MAINTENANCE: ICD-10-CM

## 2025-01-16 DIAGNOSIS — Z13.21 ENCOUNTER FOR VITAMIN DEFICIENCY SCREENING: ICD-10-CM

## 2025-01-16 DIAGNOSIS — E11.65 TYPE 2 DIABETES MELLITUS WITH HYPERGLYCEMIA, WITH LONG-TERM CURRENT USE OF INSULIN (H): ICD-10-CM

## 2025-01-16 LAB
EST. AVERAGE GLUCOSE BLD GHB EST-MCNC: 169 MG/DL
HBA1C MFR BLD: 7.5 % (ref 0–5.6)
HOLD SPECIMEN: NORMAL

## 2025-01-16 RX ORDER — LISINOPRIL 5 MG/1
2.5 TABLET ORAL DAILY
Qty: 45 TABLET | Refills: 3 | Status: SHIPPED | OUTPATIENT
Start: 2025-01-16

## 2025-01-16 RX ORDER — ATORVASTATIN CALCIUM 40 MG/1
40 TABLET, FILM COATED ORAL DAILY
Qty: 90 TABLET | Refills: 1 | Status: SHIPPED | OUTPATIENT
Start: 2025-01-16

## 2025-01-16 SDOH — HEALTH STABILITY: PHYSICAL HEALTH: ON AVERAGE, HOW MANY DAYS PER WEEK DO YOU ENGAGE IN MODERATE TO STRENUOUS EXERCISE (LIKE A BRISK WALK)?: 0 DAYS

## 2025-01-16 SDOH — HEALTH STABILITY: PHYSICAL HEALTH: ON AVERAGE, HOW MANY MINUTES DO YOU ENGAGE IN EXERCISE AT THIS LEVEL?: 0 MIN

## 2025-01-16 ASSESSMENT — SOCIAL DETERMINANTS OF HEALTH (SDOH): HOW OFTEN DO YOU GET TOGETHER WITH FRIENDS OR RELATIVES?: PATIENT DECLINED

## 2025-01-16 NOTE — PATIENT INSTRUCTIONS
Results for orders placed or performed in visit on 01/16/25   Hemoglobin A1c     Status: Abnormal   Result Value Ref Range    Estimated Average Glucose 169 (H) <117 mg/dL    Hemoglobin A1C 7.5 (H) 0.0 - 5.6 %   Extra Tube     Status: None (In process)    Narrative    The following orders were created for panel order Extra Tube.  Procedure                               Abnormality         Status                     ---------                               -----------         ------                     Extra Blue Top Tube[[id number]]                              In process                 Extra Red Top Tube[[id number]]                               In process                 Extra Green Top (Lithium...[[id number]]                      In process                 Extra Purple Top Tube[[id number]]                            In process                   Please view results for these tests on the individual orders.               Patient Education   Preventive Care Advice   This is general advice given by our system to help you stay healthy. However, your care team may have specific advice just for you. Please talk to your care team about your preventive care needs.  Nutrition  Eat 5 or more servings of fruits and vegetables each day.  Try wheat bread, brown rice and whole grain pasta (instead of white bread, rice, and pasta).  Get enough calcium and vitamin D. Check the label on foods and aim for 100% of the RDA (recommended daily allowance).  Lifestyle  Exercise at least 150 minutes each week  (30 minutes a day, 5 days a week).  Do muscle strengthening activities 2 days a week. These help control your weight and prevent disease.  No smoking.  Wear sunscreen to prevent skin cancer.  Have a dental exam and cleaning every 6 months.  Yearly exams  See your health care team every year to talk about:  Any changes in your health.  Any medicines your care team has prescribed.  Preventive care, family planning, and ways to prevent  chronic diseases.  Shots (vaccines)   HPV shots (up to age 26), if you've never had them before.  Hepatitis B shots (up to age 59), if you've never had them before.  COVID-19 shot: Get this shot when it's due.  Flu shot: Get a flu shot every year.  Tetanus shot: Get a tetanus shot every 10 years.  Pneumococcal, hepatitis A, and RSV shots: Ask your care team if you need these based on your risk.  Shingles shot (for age 50 and up)  General health tests  Diabetes screening:  Starting at age 35, Get screened for diabetes at least every 3 years.  If you are younger than age 35, ask your care team if you should be screened for diabetes.  Cholesterol test: At age 39, start having a cholesterol test every 5 years, or more often if advised.  Bone density scan (DEXA): At age 50, ask your care team if you should have this scan for osteoporosis (brittle bones).  Hepatitis C: Get tested at least once in your life.  STIs (sexually transmitted infections)  Before age 24: Ask your care team if you should be screened for STIs.  After age 24: Get screened for STIs if you're at risk. You are at risk for STIs (including HIV) if:  You are sexually active with more than one person.  You don't use condoms every time.  You or a partner was diagnosed with a sexually transmitted infection.  If you are at risk for HIV, ask about PrEP medicine to prevent HIV.  Get tested for HIV at least once in your life, whether you are at risk for HIV or not.  Cancer screening tests  Cervical cancer screening: If you have a cervix, begin getting regular cervical cancer screening tests starting at age 21.  Breast cancer scan (mammogram): If you've ever had breasts, begin having regular mammograms starting at age 40. This is a scan to check for breast cancer.  Colon cancer screening: It is important to start screening for colon cancer at age 45.  Have a colonoscopy test every 10 years (or more often if you're at risk) Or, ask your provider about stool tests  like a FIT test every year or Cologuard test every 3 years.  To learn more about your testing options, visit:   .  For help making a decision, visit:   https://bit.ly/xd73907.  Prostate cancer screening test: If you have a prostate, ask your care team if a prostate cancer screening test (PSA) at age 55 is right for you.  Lung cancer screening: If you are a current or former smoker ages 50 to 80, ask your care team if ongoing lung cancer screenings are right for you.  For informational purposes only. Not to replace the advice of your health care provider. Copyright   2023 [employer]. All rights reserved. Clinically reviewed by the [facility] Transitions Program. [employer] 661050 - REV 01/24.

## 2025-01-16 NOTE — PROGRESS NOTES
Preventive Care Visit  [facility] [place]  VIKI Rushing CNP, Family Medicine  Jan 16, 2025  {Provider  Link to SmartSet :356383}    {PROVIDER CHARTING PREFERENCE:888567}    Burke Moon is a 55 year old, presenting for the following:  Physical        1/16/2025     2:19 PM   Additional Questions   Roomed by Chantal CLEMENS        Via the Health Maintenance questionnaire, the patient has reported the following services have been completed -Mammogram: NA 2024-10-04, this information has been sent to the abstraction team.    HPI    Left lower back, left buttocks pain onset 3 weeks ago.   Sharp pain intermittent, radiates to left buttocks and intermittently radiates to left leg.    No numbness or tingling.  No bowel or bladder incontinence.    Hasn't been able to walk due to pain.  Aggravated with prolonged sitting.    Alleviated with lying down on right side.     Diabetes Follow-up    How often are you checking your blood sugar? 2 times a day  What concerns do you have today about your diabetes? None   Do you have any of these symptoms? (Select all that apply)  No numbness or tingling in feet.  No redness, sores or blisters on feet.  No complaints of excessive thirst.  No reports of blurry vision.  No significant changes to weight.      BP Readings from Last 2 Encounters:   01/16/25 110/84   09/24/24 112/62     Hemoglobin A1C (%)   Date Value   01/16/2025 7.5 (H)   09/24/2024 8.9 (H)     LDL Cholesterol Calculated (mg/dL)   Date Value   02/22/2024 130 (H)       {Reference  Diabetes Management Resources  Blood Glucose Log - 3 weeks  Blood Glucose Log with Food and Insulin Record :386905}      Hyperlipidemia Follow-Up    Are you regularly taking any medication or supplement to lower your cholesterol?   Yes- Litpitor  Are you having muscle aches or other side effects that you think could be caused by your cholesterol lowering medication?  No    Hypertension Follow-up    Do you check your  blood pressure regularly outside of the clinic? Yes   Are you following a low salt diet? Yes  Are your blood pressures ever more than 140 on the top number (systolic) OR more   than 90 on the bottom number (diastolic), for example 140/90? No  {additonal problems for provider to add (Optional):744455}  Health Care Directive  Patient does not have a Health Care Directive: {ADVANCE_DIRECTIVE_STATUS:626115}      1/16/2025   General Health   How would you rate your overall physical health? (!) FAIR   Feel stress (tense, anxious, or unable to sleep) Patient declined         1/16/2025   Nutrition   Three or more servings of calcium each day? (!) NO   Diet: Regular (no restrictions)   How many servings of fruit and vegetables per day? (!) 0-1   How many sweetened beverages each day? 0-1         1/16/2025   Exercise   Days per week of moderate/strenous exercise 0 days   Average minutes spent exercising at this level 0 min   (!) EXERCISE CONCERN      1/16/2025   Social Factors   Frequency of gathering with friends or relatives Patient declined   Worry food won't last until get money to buy more Patient declined   Food not last or not have enough money for food? Patient declined   Do you have housing? (Housing is defined as stable permanent housing and does not include staying ouside in a car, in a tent, in an abandoned building, in an overnight shelter, or couch-surfing.) Patient declined   Are you worried about losing your housing? Patient declined   Lack of transportation? Patient declined   Unable to get utilities (heat,electricity)? Patient declined         1/16/2025   Fall Risk   Fallen 2 or more times in the past year? No   Trouble with walking or balance? No          1/16/2025   Dental   Dentist two times every year? (!) NO         1/16/2025   TB Screening   Were you born outside of the [place]? Decline         Today's PHQ-2 Score:       1/16/2025     2:27 PM   PHQ-2 ( 1999 [employer])   Q1: Little interest or pleasure in doing  "things 0   Q2: Feeling down, depressed or hopeless 0   PHQ-2 Score 0           1/16/2025   Substance Use   Alcohol more than 3/day or more than 7/wk No   Do you use any other substances recreationally? (!) DECLINE     Social History     Tobacco Use    Smoking status: Never    Smokeless tobacco: Never   Vaping Use    Vaping status: Never Used   Substance Use Topics    Alcohol use: Never    Drug use: Never     {Provider  If there are gaps in the social history shown above, please follow the link to update and then refresh the note Link to Social and Substance History :994768}     {Mammogram Decision Support (Optional):191122}          1/16/2025   One time HIV Screening   Previous HIV test? Decline         1/16/2025   STI Screening   New sexual partner(s) since last STI/HIV test? (!) DECLINE     History of abnormal Pap smear: { :828729}        Latest Ref Rng & Units 9/24/2024     2:03 PM   PAP / HPV   PAP  Negative for Intraepithelial Lesion or Malignancy (NILM)    HPV 16 DNA Negative Negative    HPV 18 DNA Negative Negative    Other HR HPV Negative Negative      ASCVD Risk   The 10-year ASCVD risk score (Pawel MOTA, et al., 2019) is: 7%    Values used to calculate the score:      Age: 55 years      Sex: Female      Is Non- : Yes      Diabetic: Yes      Tobacco smoker: No      Systolic Blood Pressure: 110 mmHg      Is BP treated: Yes      HDL Cholesterol: 62 mg/dL      Total Cholesterol: 209 mg/dL    {Link to Fracture Risk Assessment Tool (Optional):639972}    {Provider  REQUIRED FOR AWV Use the storyboard to review patient history, after sections have been marked as reviewed, refresh note to capture documentation:137837}   Reviewed and updated as needed this visit by Provider                    {HISTORY OPTIONS (Optional):468238}    {ROS Picklists (Optional):190682}     Objective    Exam  /84   Pulse 70   Temp (!) 96.3  F (35.7  C) (Tympanic)   Resp 18   Ht 1.6 m (5' 3\")   " "Wt 73 kg (161 lb)   SpO2 99%   BMI 28.52 kg/m     Estimated body mass index is 28.52 kg/m  as calculated from the following:    Height as of this encounter: 1.6 m (5' 3\").    Weight as of this encounter: 73 kg (161 lb).    Physical Exam  {Exam Choices (Optional):996622}    Diabetic Foot Screen:  Any complaints of increased pain or numbness ? { :467832}  Is there a foot ulcer now or a history of foot ulcer? { :544453}  Does the foot have an abnormal shape? { :475549}  Are the nails thick, too long or ingrown? { :208089}  Are there any redness or open areas? { :214920}           Sensation Testing done at all points on the diagram with monofilament     Right Foot: Sensation {Normal/Absent at points:287097}  Left Foot: Sensation {Normal/Absent at points:882557}     Risk Category: {:033003}  Performed by VIKI Rushing CNP              Signed Electronically by: VIKI Rushing CNP  {Email feedback regarding this note to [email or web address]   :521398}  "

## 2025-01-20 ENCOUNTER — TELEPHONE (OUTPATIENT)
Dept: FAMILY MEDICINE | Facility: CLINIC | Age: 55
End: 2025-01-20
Payer: COMMERCIAL

## 2025-01-20 DIAGNOSIS — Z00.00 HEALTHCARE MAINTENANCE: Primary | ICD-10-CM

## 2025-01-20 NOTE — TELEPHONE ENCOUNTER
Medication Question or Refill    correction needed on script    What medication are you calling about (include dose and sig)?:     Calcium Carbonate-Vitamin D (CALCIUM 600-VITAMIN D3) 600-3.125 MG-MCG TABS - reads wrong 600 3.125 is not a good # is not a round # for Vit D?  Please resubmit - 5 micrograms = 200 international units.    Preferred Pharmacy:   [id number] - [place] [id number] AT [facility] [address] [address]  Phone: [phone] Fax: [phone]        Controlled Substance Agreement on file:   CSA -- Patient Level:    CSA: None found at the patient level.       Who prescribed the medication?: mindy BRITT    Do you need a refill? Yes    When did you use the medication last? na    Patient offered an appointment? No    Do you have any questions or concerns?  No      Could we send this information to you in [employer] or would you prefer to receive a phone call?:   jaya DOYLE

## 2025-04-27 ENCOUNTER — HEALTH MAINTENANCE LETTER (OUTPATIENT)
Age: 55
End: 2025-04-27

## 2025-05-15 ENCOUNTER — RESULTS FOLLOW-UP (OUTPATIENT)
Dept: FAMILY MEDICINE | Facility: CLINIC | Age: 55
End: 2025-05-15

## 2025-05-15 ENCOUNTER — LAB (OUTPATIENT)
Dept: LAB | Facility: CLINIC | Age: 55
End: 2025-05-15
Payer: COMMERCIAL

## 2025-05-15 ENCOUNTER — VIRTUAL VISIT (OUTPATIENT)
Dept: FAMILY MEDICINE | Facility: CLINIC | Age: 55
End: 2025-05-15
Payer: COMMERCIAL

## 2025-05-15 DIAGNOSIS — E11.65 TYPE 2 DIABETES MELLITUS WITH HYPERGLYCEMIA, WITH LONG-TERM CURRENT USE OF INSULIN (H): Primary | ICD-10-CM

## 2025-05-15 DIAGNOSIS — Z00.00 HEALTHCARE MAINTENANCE: ICD-10-CM

## 2025-05-15 DIAGNOSIS — Z79.4 TYPE 2 DIABETES MELLITUS WITH HYPERGLYCEMIA, WITH LONG-TERM CURRENT USE OF INSULIN (H): Primary | ICD-10-CM

## 2025-05-15 DIAGNOSIS — J30.2 SEASONAL ALLERGIES: ICD-10-CM

## 2025-05-15 DIAGNOSIS — I10 BENIGN ESSENTIAL HYPERTENSION: ICD-10-CM

## 2025-05-15 DIAGNOSIS — M54.16 LUMBAR BACK PAIN WITH RADICULOPATHY AFFECTING LEFT LOWER EXTREMITY: ICD-10-CM

## 2025-05-15 LAB
CREAT UR-MCNC: 121 MG/DL
EST. AVERAGE GLUCOSE BLD GHB EST-MCNC: 183 MG/DL
HBA1C MFR BLD: 8 % (ref 0–5.6)
MICROALBUMIN UR-MCNC: 66.6 MG/L
MICROALBUMIN/CREAT UR: 55.04 MG/G CR (ref 0–25)

## 2025-05-15 RX ORDER — LISINOPRIL 5 MG/1
2.5 TABLET ORAL DAILY
Qty: 45 TABLET | Refills: 3 | Status: SHIPPED | OUTPATIENT
Start: 2025-05-15

## 2025-05-15 RX ORDER — ATORVASTATIN CALCIUM 40 MG/1
40 TABLET, FILM COATED ORAL DAILY
Qty: 90 TABLET | Refills: 3 | Status: SHIPPED | OUTPATIENT
Start: 2025-05-15

## 2025-05-15 RX ORDER — CETIRIZINE HYDROCHLORIDE 10 MG/1
10 TABLET ORAL PRN
Qty: 90 TABLET | Refills: 3 | Status: SHIPPED | OUTPATIENT
Start: 2025-05-15

## 2025-05-15 NOTE — PROGRESS NOTES
"Sarai is a 55 year old who is being evaluated via a billable video visit.    How would you like to obtain your AVS? MyChart  If the video visit is dropped, the invitation should be resent by: Text to cell phone: [phone]  Will anyone else be joining your video visit? No    Assessment & Plan       Sarai was seen today for recheck medication.    Diagnoses and all orders for this visit:    Type 2 diabetes mellitus with hyperglycemia, with long-term current use of insulin (H)  Noted elevation in HgbA1C due to inconsistent use of Ozempic (difficulty with obtaining from pharmacy).   Prescriptions sent to new pharmacy ( mail pharmacy) and will plan follow-up in 3 months for Diabetes recheck.    -     Semaglutide, 2 MG/DOSE, (OZEMPIC) 8 MG/3ML pen; Inject 2 mg subcutaneously every 7 days.  -     insulin glargine (LANTUS PEN) 100 UNIT/ML pen; Inject 26 Units subcutaneously at bedtime.  -     atorvastatin (LIPITOR) 40 MG tablet; Take 1 tablet (40 mg) by mouth daily.  -     blood glucose (NO BRAND SPECIFIED) lancets standard; Use to test blood sugar 3 times daily or as directed.  -     blood glucose (NO BRAND SPECIFIED) test strip; Use to test blood sugar 3 times daily or as directed.    Healthcare maintenance  -     calcium carbonate-vitamin D (OSCAL) 500-5 MG-MCG tablet; Take 1 tablet by mouth 2 times daily.    Benign essential hypertension  Stable on Lisinopril 2.5 mg daily.    -     lisinopril (ZESTRIL) 5 MG tablet; Take 0.5 tablets (2.5 mg) by mouth daily.    Lumbar back pain with radiculopathy affecting left lower extremity  Intermittent use of Tizanidine.    -     tiZANidine (ZANAFLEX) 4 MG tablet; Take 1 tablet (4 mg) by mouth nightly as needed for muscle spasms.    Seasonal allergies  -     cetirizine (ZYRTEC) 10 MG tablet; Take 1 tablet (10 mg) by mouth as needed for allergies.            BMI  Estimated body mass index is 28.52 kg/m  as calculated from the following:    Height as of 1/16/25: 1.6 m (5' 3\").    " Weight as of 1/16/25: 73 kg (161 lb).     Return in about 3 months (around 8/15/2025) for Diabetes Follow-up, In Clinic.        Burke Moon is a 55 year old, presenting for the following health issues:  Recheck Medication        5/15/2025     1:21 PM   Additional Questions   Roomed by Ingris THORNTON MA   Accompanied by Self     Video Start Time: 1:35 PM    History of Present Illness       Diabetes:   She presents for follow up of diabetes.   She is checking home blood glucose with a continuous glucose monitor.   She checks blood glucose before meals and at bedtime.  Blood glucose is sometimes over 200 and sometimes under 70. She is aware of hypoglycemia symptoms including shakiness, dizziness, weakness, blurred vision and confusion.   She is concerned about other.   She is having weight loss.            She eats 2-3 servings of fruits and vegetables daily.She consumes 0 sweetened beverage(s) daily.She exercises with enough effort to increase her heart rate 9 or less minutes per day.  She exercises with enough effort to increase her heart rate 3 or less days per week.   She is taking medications regularly.      Hemoglobin A1C   Date Value Ref Range Status   05/15/2025 8.0 (H) 0.0 - 5.6 % Final     Comment:     Normal <5.7%   Prediabetes 5.7-6.4%    Diabetes 6.5% or higher     Note: Adopted from ADA consensus guidelines.   01/16/2025 7.5 (H) 0.0 - 5.6 % Final     Comment:     Normal <5.7%   Prediabetes 5.7-6.4%    Diabetes 6.5% or higher     Note: Adopted from ADA consensus guidelines.   09/24/2024 8.9 (H) 0.0 - 5.6 % Final     Comment:     Normal <5.7%   Prediabetes 5.7-6.4%    Diabetes 6.5% or higher     Note: Adopted from ADA consensus guidelines.     Patient reports difficulty with pharmacy.    Injection day:  has changed and missed due to not getting Ozempic from pharmacy in a timely manner.    Last dose was yesterday.        Review of Systems  Constitutional, HEENT, cardiovascular, pulmonary, gi and gu systems are  negative, except as otherwise noted.      Objective           Vitals:  No vitals were obtained today due to virtual visit.    Physical Exam     GENERAL: alert and no distress  PSYCH: Appropriate affect, tone, and pace of words        Video-Visit Details    Type of service:  Video Visit   Video End Time:1:44 PM  Originating Location (pt. Location): Home    Distant Location (provider location):  On-site  Platform used for Video Visit: William      Signed Electronically by: VIKI Rushing CNP

## 2025-05-15 NOTE — RESULT ENCOUNTER NOTE
Dear Sarai,     -A1C (test of diabetes control the last 2-3 months) is above your goal and has increased slightly.  I encourage continued close attention to blood sugars.  Please schedule an in clinic appointment in 3 months for a Diabetes follow-up and repeat lab testing at that time.          Thank you,     VIKI Rushing, CNP  [facility] [place] [facility] [place]    If you have further questions about the interpretation of your labs, [email or web address] is a good website to check out for further information.

## 2025-05-16 NOTE — RESULT ENCOUNTER NOTE
Dear Sarai,     -Microalbumin (urine protein) level is elevated, but has improved slightly. This is suggestive of early damage to your kidneys from diabetes.  ADVISE: avoiding anti-inflamatory agents such as ibuprofen (Advil, Motrin) or naproxen (Aleve) as much as possible, keeping your blood pressure in a normal range, and continuing your medication (lisinopril) that helps protect your kidneys.  Also, this should be rechecked in 1 year.       Thank you,   Evelin Cota, APRN, CNP  [facility]    If you have further questions about the interpretation of your labs, [email or web address] is a good website to check out for further information.

## 2025-05-20 ENCOUNTER — MYC MEDICAL ADVICE (OUTPATIENT)
Dept: FAMILY MEDICINE | Facility: CLINIC | Age: 55
End: 2025-05-20
Payer: COMMERCIAL

## 2025-05-20 DIAGNOSIS — Z79.4 TYPE 2 DIABETES MELLITUS WITH HYPERGLYCEMIA, WITH LONG-TERM CURRENT USE OF INSULIN (H): ICD-10-CM

## 2025-05-20 DIAGNOSIS — I10 BENIGN ESSENTIAL HYPERTENSION: ICD-10-CM

## 2025-05-20 DIAGNOSIS — E11.65 TYPE 2 DIABETES MELLITUS WITH HYPERGLYCEMIA, WITH LONG-TERM CURRENT USE OF INSULIN (H): ICD-10-CM

## 2025-05-20 DIAGNOSIS — J30.2 SEASONAL ALLERGIES: ICD-10-CM

## 2025-05-20 DIAGNOSIS — M54.16 LUMBAR BACK PAIN WITH RADICULOPATHY AFFECTING LEFT LOWER EXTREMITY: ICD-10-CM

## 2025-05-20 DIAGNOSIS — Z00.00 HEALTHCARE MAINTENANCE: ICD-10-CM

## 2025-05-21 ENCOUNTER — TELEPHONE (OUTPATIENT)
Dept: FAMILY MEDICINE | Facility: CLINIC | Age: 55
End: 2025-05-21
Payer: COMMERCIAL

## 2025-05-21 RX ORDER — CETIRIZINE HYDROCHLORIDE 10 MG/1
10 TABLET ORAL PRN
Qty: 90 TABLET | Refills: 3 | Status: SHIPPED | OUTPATIENT
Start: 2025-05-21

## 2025-05-21 RX ORDER — LISINOPRIL 5 MG/1
2.5 TABLET ORAL DAILY
Qty: 45 TABLET | Refills: 3 | Status: SHIPPED | OUTPATIENT
Start: 2025-05-21

## 2025-05-21 RX ORDER — ATORVASTATIN CALCIUM 40 MG/1
40 TABLET, FILM COATED ORAL DAILY
Qty: 90 TABLET | Refills: 3 | Status: SHIPPED | OUTPATIENT
Start: 2025-05-21

## 2025-05-21 NOTE — TELEPHONE ENCOUNTER
New pharmacy for this refill request. Remaining refills sent to this new pharmacy.    Lyssa Kellogg BSN, RN

## 2025-05-21 NOTE — TELEPHONE ENCOUNTER
Prior Authorization Retail Medication Request    Medication/Dose: Accu-Chek Guide Test Strips  Diagnosis and ICD code (if different than what is on RX):    New/renewal/insurance change PA/secondary ins. PA:  Previously Tried and Failed:    Rationale:      Insurance   Primary: [facility]  Insurance ID:  [id number]     Secondary (if applicable):  Insurance ID:      Pharmacy Information (if different than what is on RX)  Name:  [facility]  Phone:  [phone]  Fax:[phone]    Clinic Information  Preferred routing pool for dept communication:

## 2025-05-25 NOTE — TELEPHONE ENCOUNTER
PA Initiation    Medication: ACCU-CHEK GUIDE TEST VI STRP  Insurance Company: [employer] - Phone [phone] Fax [phone]  Pharmacy Filling the Rx: [id number] [address]  Filling Pharmacy Phone: [phone]  Filling Pharmacy Fax: [phone]  Start Date: 5/25/2025

## 2025-05-27 NOTE — TELEPHONE ENCOUNTER
Prior Authorization Not Needed per Insurance    Medication: ACCU-CHEK GUIDE TEST VI STRP  Insurance Company: [employer] - Phone [phone] Fax [phone]  Expected CoPay: $    Pharmacy Filling the Rx: [id number] - [facility] [place] [address] AT [address]  Pharmacy Notified: YES  Patient Notified: **Instructed pharmacy to notify patient when script is ready to /ship.**

## 2025-07-06 ENCOUNTER — E-VISIT (OUTPATIENT)
Dept: URGENT CARE | Facility: CLINIC | Age: 55
End: 2025-07-06
Payer: COMMERCIAL

## 2025-07-06 DIAGNOSIS — J02.9 SORE THROAT: Primary | ICD-10-CM

## 2025-07-06 NOTE — PATIENT INSTRUCTIONS
Dear Sarai,    After reviewing your responses, I would like you to come in for a swab to make sure we treat you correctly. This swab is to evaluate you for possible Strep Throat, and should be scheduled for today or tomorrow. Please use the Schedule Now button in [facility] to schedule your swab. Otherwise, click this link to schedule a lab only appointment.    Lab appointments are not available at most locations on the weekends. If no Lab Only appointment is available, you should be seen in any of our convenient Urgent Care Centers for an in person visit, which can be found on our website here.    You will receive instructions with your results in [employer] once they are available.     If your symptoms worsen, you develop difficulty breathing, difficulty with drinking enough to stay hydrated, difficulty swallowing your saliva or have fevers for more than 5 days, please contact your primary care provider for an appointment or visit an Urgent Care Center to be seen.      Thanks again for choosing us as your health care partner.   Erin Patel PA-C  Sore Throat: Care Instructions  Overview     Infection by bacteria or a virus causes most sore throats. Cigarette smoke, dry air, air pollution, allergies, and yelling can also cause a sore throat. Sore throats can be painful and annoying. Fortunately, most sore throats go away on their own. If you have a bacterial infection, your doctor may prescribe antibiotics.  Follow-up care is a key part of your treatment and safety. Be sure to make and go to all appointments, and call your doctor if you are having problems. It's also a good idea to know your test results and keep a list of the medicines you take.  How can you care for yourself at home?  If your doctor prescribed antibiotics, take them as directed. Do not stop taking them just because you feel better. You need to take the full course of antibiotics.  Gargle with warm salt water several times a day to help reduce swelling  "and relieve pain. Mix 1/2 teaspoon of salt in 1 cup of warm water.  Take an over-the-counter pain medicine, such as acetaminophen (Tylenol), ibuprofen (Advil, Motrin), or naproxen (Aleve). Read and follow all instructions on the label.  Be careful when taking over-the-counter cold or flu medicines and Tylenol at the same time. Many of these medicines have acetaminophen, which is Tylenol. Read the labels to make sure that you are not taking more than the recommended dose. Too much acetaminophen (Tylenol) can be harmful.  Drink plenty of fluids. Fluids may help soothe an irritated throat. Hot fluids, such as tea or soup, may help decrease throat pain.  Use over-the-counter throat lozenges to soothe pain. Regular cough drops or hard candy may also help. These should not be given to young children because of the risk of choking.  Do not smoke or allow others to smoke around you. If you need help quitting, talk to your doctor about stop-smoking programs and medicines. These can increase your chances of quitting for good.  Use a vaporizer or humidifier to add moisture to your bedroom. Follow the directions for cleaning the machine.  When should you call for help?   Call your doctor now or seek immediate medical care if:    You have trouble breathing.     Your sore throat gets much worse on one side.     You have new or worse trouble swallowing.     You have a new or higher fever.   Watch closely for changes in your health, and be sure to contact your doctor if you do not get better as expected.  Where can you learn more?  Go to https://www.Ant[employer].net/patiented  Enter U420 in the search box to learn more about \"Sore Throat: Care Instructions.\"  Current as of: October 27, 2024  Content Version: 14.5    3379-6307 [facility] [employer]   Care instructions adapted under license by your healthcare professional. If you have questions about a medical condition or this instruction, always ask your healthcare professional. " [employer], [facility] disclaims any warranty or liability for your use of this information.

## 2025-07-09 ENCOUNTER — MYC REFILL (OUTPATIENT)
Dept: FAMILY MEDICINE | Facility: CLINIC | Age: 55
End: 2025-07-09
Payer: COMMERCIAL

## 2025-07-09 DIAGNOSIS — E11.65 TYPE 2 DIABETES MELLITUS WITH HYPERGLYCEMIA, WITH LONG-TERM CURRENT USE OF INSULIN (H): ICD-10-CM

## 2025-07-09 DIAGNOSIS — Z79.4 TYPE 2 DIABETES MELLITUS WITH HYPERGLYCEMIA, WITH LONG-TERM CURRENT USE OF INSULIN (H): ICD-10-CM

## 2025-08-31 ENCOUNTER — HEALTH MAINTENANCE LETTER (OUTPATIENT)
Age: 55
End: 2025-08-31